# Patient Record
Sex: MALE | Race: WHITE | NOT HISPANIC OR LATINO | URBAN - METROPOLITAN AREA
[De-identification: names, ages, dates, MRNs, and addresses within clinical notes are randomized per-mention and may not be internally consistent; named-entity substitution may affect disease eponyms.]

---

## 2017-06-26 ENCOUNTER — OUTPATIENT (OUTPATIENT)
Dept: OUTPATIENT SERVICES | Facility: HOSPITAL | Age: 57
LOS: 1 days | Discharge: HOME | End: 2017-06-26

## 2017-06-28 ENCOUNTER — OUTPATIENT (OUTPATIENT)
Dept: OUTPATIENT SERVICES | Facility: HOSPITAL | Age: 57
LOS: 1 days | Discharge: HOME | End: 2017-06-28

## 2017-06-28 DIAGNOSIS — R31.9 HEMATURIA, UNSPECIFIED: ICD-10-CM

## 2017-06-28 DIAGNOSIS — E11.00 TYPE 2 DIABETES MELLITUS WITH HYPEROSMOLARITY WITHOUT NONKETOTIC HYPERGLYCEMIC-HYPEROSMOLAR COMA (NKHHC): ICD-10-CM

## 2017-06-28 DIAGNOSIS — R94.5 ABNORMAL RESULTS OF LIVER FUNCTION STUDIES: ICD-10-CM

## 2017-06-28 DIAGNOSIS — D50.9 IRON DEFICIENCY ANEMIA, UNSPECIFIED: ICD-10-CM

## 2017-06-28 DIAGNOSIS — R70.0 ELEVATED ERYTHROCYTE SEDIMENTATION RATE: ICD-10-CM

## 2017-06-28 DIAGNOSIS — D51.9 VITAMIN B12 DEFICIENCY ANEMIA, UNSPECIFIED: ICD-10-CM

## 2017-06-28 DIAGNOSIS — N40.0 BENIGN PROSTATIC HYPERPLASIA WITHOUT LOWER URINARY TRACT SYMPTOMS: ICD-10-CM

## 2017-06-28 DIAGNOSIS — E78.9 DISORDER OF LIPOPROTEIN METABOLISM, UNSPECIFIED: ICD-10-CM

## 2017-06-28 DIAGNOSIS — I10 ESSENTIAL (PRIMARY) HYPERTENSION: ICD-10-CM

## 2017-06-28 DIAGNOSIS — Z02.9 ENCOUNTER FOR ADMINISTRATIVE EXAMINATIONS, UNSPECIFIED: ICD-10-CM

## 2017-06-28 DIAGNOSIS — E50.9 VITAMIN A DEFICIENCY, UNSPECIFIED: ICD-10-CM

## 2017-06-28 DIAGNOSIS — E03.9 HYPOTHYROIDISM, UNSPECIFIED: ICD-10-CM

## 2017-06-28 DIAGNOSIS — D64.3 OTHER SIDEROBLASTIC ANEMIAS: ICD-10-CM

## 2017-06-28 DIAGNOSIS — E55.9 VITAMIN D DEFICIENCY, UNSPECIFIED: ICD-10-CM

## 2017-06-28 DIAGNOSIS — E07.9 DISORDER OF THYROID, UNSPECIFIED: ICD-10-CM

## 2017-06-28 DIAGNOSIS — R19.8 OTHER SPECIFIED SYMPTOMS AND SIGNS INVOLVING THE DIGESTIVE SYSTEM AND ABDOMEN: ICD-10-CM

## 2017-06-28 DIAGNOSIS — E78.5 HYPERLIPIDEMIA, UNSPECIFIED: ICD-10-CM

## 2018-01-26 ENCOUNTER — OUTPATIENT (OUTPATIENT)
Dept: OUTPATIENT SERVICES | Facility: HOSPITAL | Age: 58
LOS: 1 days | Discharge: HOME | End: 2018-01-26

## 2018-01-26 DIAGNOSIS — E11.00 TYPE 2 DIABETES MELLITUS WITH HYPEROSMOLARITY WITHOUT NONKETOTIC HYPERGLYCEMIC-HYPEROSMOLAR COMA (NKHHC): ICD-10-CM

## 2018-01-26 DIAGNOSIS — Z02.9 ENCOUNTER FOR ADMINISTRATIVE EXAMINATIONS, UNSPECIFIED: ICD-10-CM

## 2018-01-26 DIAGNOSIS — D64.3 OTHER SIDEROBLASTIC ANEMIAS: ICD-10-CM

## 2018-01-26 DIAGNOSIS — I10 ESSENTIAL (PRIMARY) HYPERTENSION: ICD-10-CM

## 2018-01-26 DIAGNOSIS — E78.5 HYPERLIPIDEMIA, UNSPECIFIED: ICD-10-CM

## 2018-01-26 DIAGNOSIS — E78.9 DISORDER OF LIPOPROTEIN METABOLISM, UNSPECIFIED: ICD-10-CM

## 2018-01-26 DIAGNOSIS — D51.9 VITAMIN B12 DEFICIENCY ANEMIA, UNSPECIFIED: ICD-10-CM

## 2018-01-26 DIAGNOSIS — R94.5 ABNORMAL RESULTS OF LIVER FUNCTION STUDIES: ICD-10-CM

## 2018-01-26 DIAGNOSIS — R31.9 HEMATURIA, UNSPECIFIED: ICD-10-CM

## 2018-01-26 DIAGNOSIS — E03.9 HYPOTHYROIDISM, UNSPECIFIED: ICD-10-CM

## 2018-01-26 DIAGNOSIS — R19.8 OTHER SPECIFIED SYMPTOMS AND SIGNS INVOLVING THE DIGESTIVE SYSTEM AND ABDOMEN: ICD-10-CM

## 2018-01-26 DIAGNOSIS — D50.9 IRON DEFICIENCY ANEMIA, UNSPECIFIED: ICD-10-CM

## 2018-01-26 DIAGNOSIS — E07.9 DISORDER OF THYROID, UNSPECIFIED: ICD-10-CM

## 2018-01-29 ENCOUNTER — OUTPATIENT (OUTPATIENT)
Dept: OUTPATIENT SERVICES | Facility: HOSPITAL | Age: 58
LOS: 1 days | Discharge: HOME | End: 2018-01-29

## 2018-01-29 DIAGNOSIS — D50.9 IRON DEFICIENCY ANEMIA, UNSPECIFIED: ICD-10-CM

## 2018-01-29 DIAGNOSIS — D61.9 APLASTIC ANEMIA, UNSPECIFIED: ICD-10-CM

## 2018-01-29 DIAGNOSIS — R31.9 HEMATURIA, UNSPECIFIED: ICD-10-CM

## 2018-01-29 DIAGNOSIS — D64.9 ANEMIA, UNSPECIFIED: ICD-10-CM

## 2018-02-15 ENCOUNTER — OUTPATIENT (OUTPATIENT)
Dept: OUTPATIENT SERVICES | Facility: HOSPITAL | Age: 58
LOS: 1 days | Discharge: HOME | End: 2018-02-15

## 2018-02-15 DIAGNOSIS — R70.0 ELEVATED ERYTHROCYTE SEDIMENTATION RATE: ICD-10-CM

## 2018-02-15 DIAGNOSIS — D50.9 IRON DEFICIENCY ANEMIA, UNSPECIFIED: ICD-10-CM

## 2018-02-15 DIAGNOSIS — D64.3 OTHER SIDEROBLASTIC ANEMIAS: ICD-10-CM

## 2018-02-15 DIAGNOSIS — I10 ESSENTIAL (PRIMARY) HYPERTENSION: ICD-10-CM

## 2018-02-15 DIAGNOSIS — E03.9 HYPOTHYROIDISM, UNSPECIFIED: ICD-10-CM

## 2018-02-15 DIAGNOSIS — N40.0 BENIGN PROSTATIC HYPERPLASIA WITHOUT LOWER URINARY TRACT SYMPTOMS: ICD-10-CM

## 2018-02-15 DIAGNOSIS — E78.5 HYPERLIPIDEMIA, UNSPECIFIED: ICD-10-CM

## 2018-02-15 DIAGNOSIS — E78.9 DISORDER OF LIPOPROTEIN METABOLISM, UNSPECIFIED: ICD-10-CM

## 2018-02-15 DIAGNOSIS — E07.9 DISORDER OF THYROID, UNSPECIFIED: ICD-10-CM

## 2018-02-15 DIAGNOSIS — R31.9 HEMATURIA, UNSPECIFIED: ICD-10-CM

## 2018-02-15 DIAGNOSIS — R19.8 OTHER SPECIFIED SYMPTOMS AND SIGNS INVOLVING THE DIGESTIVE SYSTEM AND ABDOMEN: ICD-10-CM

## 2018-02-15 DIAGNOSIS — E11.00 TYPE 2 DIABETES MELLITUS WITH HYPEROSMOLARITY WITHOUT NONKETOTIC HYPERGLYCEMIC-HYPEROSMOLAR COMA (NKHHC): ICD-10-CM

## 2018-02-15 DIAGNOSIS — D51.9 VITAMIN B12 DEFICIENCY ANEMIA, UNSPECIFIED: ICD-10-CM

## 2018-02-15 DIAGNOSIS — E55.9 VITAMIN D DEFICIENCY, UNSPECIFIED: ICD-10-CM

## 2018-12-24 ENCOUNTER — INPATIENT (INPATIENT)
Facility: HOSPITAL | Age: 58
LOS: 2 days | Discharge: HOME HEALTH | End: 2018-12-27
Attending: HOSPITALIST | Admitting: HOSPITALIST

## 2018-12-24 VITALS
SYSTOLIC BLOOD PRESSURE: 116 MMHG | TEMPERATURE: 101 F | RESPIRATION RATE: 20 BRPM | DIASTOLIC BLOOD PRESSURE: 66 MMHG | OXYGEN SATURATION: 94 % | WEIGHT: 145.06 LBS | HEART RATE: 109 BPM

## 2018-12-24 LAB
ALBUMIN SERPL ELPH-MCNC: 3.6 G/DL — SIGNIFICANT CHANGE UP (ref 3.5–5.2)
ALP SERPL-CCNC: 60 U/L — SIGNIFICANT CHANGE UP (ref 30–115)
ALT FLD-CCNC: 22 U/L — SIGNIFICANT CHANGE UP (ref 0–41)
ANION GAP SERPL CALC-SCNC: 13 MMOL/L — SIGNIFICANT CHANGE UP (ref 7–14)
APPEARANCE UR: ABNORMAL
APTT BLD: 39.2 SEC — SIGNIFICANT CHANGE UP (ref 27–39.2)
AST SERPL-CCNC: 30 U/L — SIGNIFICANT CHANGE UP (ref 0–41)
BASOPHILS # BLD AUTO: 0.01 K/UL — SIGNIFICANT CHANGE UP (ref 0–0.2)
BASOPHILS NFR BLD AUTO: 0.1 % — SIGNIFICANT CHANGE UP (ref 0–1)
BILIRUB SERPL-MCNC: 0.3 MG/DL — SIGNIFICANT CHANGE UP (ref 0.2–1.2)
BILIRUB UR-MCNC: NEGATIVE — SIGNIFICANT CHANGE UP
BUN SERPL-MCNC: 10 MG/DL — SIGNIFICANT CHANGE UP (ref 10–20)
CALCIUM SERPL-MCNC: 8.4 MG/DL — LOW (ref 8.5–10.1)
CHLORIDE SERPL-SCNC: 95 MMOL/L — LOW (ref 98–110)
CO2 SERPL-SCNC: 29 MMOL/L — SIGNIFICANT CHANGE UP (ref 17–32)
COLOR SPEC: YELLOW — SIGNIFICANT CHANGE UP
CREAT SERPL-MCNC: <0.5 MG/DL — LOW (ref 0.7–1.5)
DIFF PNL FLD: ABNORMAL
EOSINOPHIL # BLD AUTO: 0 K/UL — SIGNIFICANT CHANGE UP (ref 0–0.7)
EOSINOPHIL NFR BLD AUTO: 0 % — SIGNIFICANT CHANGE UP (ref 0–8)
FLU A RESULT: POSITIVE
FLU A RESULT: POSITIVE
FLUAV AG NPH QL: POSITIVE
FLUBV AG NPH QL: NEGATIVE — SIGNIFICANT CHANGE UP
GAS PNL BLDV: SIGNIFICANT CHANGE UP
GLUCOSE SERPL-MCNC: 94 MG/DL — SIGNIFICANT CHANGE UP (ref 70–99)
GLUCOSE UR QL: NEGATIVE MG/DL — SIGNIFICANT CHANGE UP
HCT VFR BLD CALC: 42.1 % — SIGNIFICANT CHANGE UP (ref 42–52)
HGB BLD-MCNC: 13.8 G/DL — LOW (ref 14–18)
IMM GRANULOCYTES NFR BLD AUTO: 0.3 % — SIGNIFICANT CHANGE UP (ref 0.1–0.3)
INR BLD: 1.07 RATIO — SIGNIFICANT CHANGE UP (ref 0.65–1.3)
KETONES UR-MCNC: >=80
LEUKOCYTE ESTERASE UR-ACNC: NEGATIVE — SIGNIFICANT CHANGE UP
LYMPHOCYTES # BLD AUTO: 0.37 K/UL — LOW (ref 1.2–3.4)
LYMPHOCYTES # BLD AUTO: 3.2 % — LOW (ref 20.5–51.1)
MCHC RBC-ENTMCNC: 28.8 PG — SIGNIFICANT CHANGE UP (ref 27–31)
MCHC RBC-ENTMCNC: 32.8 G/DL — SIGNIFICANT CHANGE UP (ref 32–37)
MCV RBC AUTO: 87.7 FL — SIGNIFICANT CHANGE UP (ref 80–94)
MONOCYTES # BLD AUTO: 0.83 K/UL — HIGH (ref 0.1–0.6)
MONOCYTES NFR BLD AUTO: 7.1 % — SIGNIFICANT CHANGE UP (ref 1.7–9.3)
NEUTROPHILS # BLD AUTO: 10.39 K/UL — HIGH (ref 1.4–6.5)
NEUTROPHILS NFR BLD AUTO: 89.3 % — HIGH (ref 42.2–75.2)
NITRITE UR-MCNC: NEGATIVE — SIGNIFICANT CHANGE UP
PH UR: 5.5 — SIGNIFICANT CHANGE UP (ref 5–8)
PLATELET # BLD AUTO: 103 K/UL — LOW (ref 130–400)
POTASSIUM SERPL-MCNC: 3.6 MMOL/L — SIGNIFICANT CHANGE UP (ref 3.5–5)
POTASSIUM SERPL-SCNC: 3.6 MMOL/L — SIGNIFICANT CHANGE UP (ref 3.5–5)
PROT SERPL-MCNC: 6.3 G/DL — SIGNIFICANT CHANGE UP (ref 6–8)
PROT UR-MCNC: NEGATIVE MG/DL — SIGNIFICANT CHANGE UP
PROTHROM AB SERPL-ACNC: 12.3 SEC — SIGNIFICANT CHANGE UP (ref 9.95–12.87)
RBC # BLD: 4.8 M/UL — SIGNIFICANT CHANGE UP (ref 4.7–6.1)
RBC # FLD: 13.8 % — SIGNIFICANT CHANGE UP (ref 11.5–14.5)
RSV RESULT: NEGATIVE — SIGNIFICANT CHANGE UP
RSV RNA RESP QL NAA+PROBE: NEGATIVE — SIGNIFICANT CHANGE UP
SODIUM SERPL-SCNC: 137 MMOL/L — SIGNIFICANT CHANGE UP (ref 135–146)
SP GR SPEC: 1.01 — SIGNIFICANT CHANGE UP (ref 1.01–1.03)
TROPONIN T SERPL-MCNC: <0.01 NG/ML — SIGNIFICANT CHANGE UP
UROBILINOGEN FLD QL: 0.2 MG/DL — SIGNIFICANT CHANGE UP (ref 0.2–0.2)
WBC # BLD: 11.64 K/UL — HIGH (ref 4.8–10.8)
WBC # FLD AUTO: 11.64 K/UL — HIGH (ref 4.8–10.8)

## 2018-12-24 RX ORDER — AZITHROMYCIN 500 MG/1
500 TABLET, FILM COATED ORAL ONCE
Qty: 0 | Refills: 0 | Status: COMPLETED | OUTPATIENT
Start: 2018-12-24 | End: 2018-12-24

## 2018-12-24 RX ORDER — CEFTRIAXONE 500 MG/1
1 INJECTION, POWDER, FOR SOLUTION INTRAMUSCULAR; INTRAVENOUS ONCE
Qty: 0 | Refills: 0 | Status: COMPLETED | OUTPATIENT
Start: 2018-12-24 | End: 2018-12-24

## 2018-12-24 RX ORDER — ENOXAPARIN SODIUM 100 MG/ML
40 INJECTION SUBCUTANEOUS EVERY 24 HOURS
Qty: 0 | Refills: 0 | Status: DISCONTINUED | OUTPATIENT
Start: 2018-12-24 | End: 2018-12-27

## 2018-12-24 RX ORDER — SODIUM CHLORIDE 9 MG/ML
2000 INJECTION, SOLUTION INTRAVENOUS ONCE
Qty: 0 | Refills: 0 | Status: COMPLETED | OUTPATIENT
Start: 2018-12-24 | End: 2018-12-24

## 2018-12-24 RX ORDER — ALBUTEROL 90 UG/1
10 AEROSOL, METERED ORAL
Qty: 0 | Refills: 0 | COMMUNITY

## 2018-12-24 RX ORDER — ALBUTEROL 90 UG/1
10 AEROSOL, METERED ORAL
Qty: 0 | Refills: 0 | Status: DISCONTINUED | OUTPATIENT
Start: 2018-12-24 | End: 2018-12-27

## 2018-12-24 RX ORDER — ACETAMINOPHEN 500 MG
650 TABLET ORAL EVERY 6 HOURS
Qty: 0 | Refills: 0 | Status: DISCONTINUED | OUTPATIENT
Start: 2018-12-24 | End: 2018-12-27

## 2018-12-24 RX ADMIN — Medication 75 MILLIGRAM(S): at 19:59

## 2018-12-24 RX ADMIN — SODIUM CHLORIDE 2000 MILLILITER(S): 9 INJECTION, SOLUTION INTRAVENOUS at 15:05

## 2018-12-24 RX ADMIN — AZITHROMYCIN 500 MILLIGRAM(S): 500 TABLET, FILM COATED ORAL at 16:46

## 2018-12-24 RX ADMIN — CEFTRIAXONE 100 GRAM(S): 500 INJECTION, POWDER, FOR SOLUTION INTRAMUSCULAR; INTRAVENOUS at 15:18

## 2018-12-24 RX ADMIN — CEFTRIAXONE 1 GRAM(S): 500 INJECTION, POWDER, FOR SOLUTION INTRAMUSCULAR; INTRAVENOUS at 15:33

## 2018-12-24 RX ADMIN — AZITHROMYCIN 255 MILLIGRAM(S): 500 TABLET, FILM COATED ORAL at 15:33

## 2018-12-24 NOTE — ED PROVIDER NOTE - PHYSICAL EXAMINATION
Con: Chronically ill appearing, cachectic, tachypneic   HEENT: NCAT PERRLA EOMI conjunctiva normal. No nasal discharge. dry MM  Neck: Supple, non tender, full ROM.   CV: regular tachycardic no MRG +S1S2.   Pulm: CTA b/l. tachypneic, retracting, poor respiratory effort  Abd: s NT ND +BS.   Ext: WWP x4, moving all extremities, no edema.   Psy: Cooperative, appropriate.   Skin: Warm, dry, no rash

## 2018-12-24 NOTE — ED PROVIDER NOTE - OBJECTIVE STATEMENT
57yo M hx LGM muscular dystrophy followed at [facility] pw cough, fever, shortness of breath since Fri getting progressively worse- typically uses CPAP at night but now requires further support during the day- no nausea/vomiting/diarrhea, abdominal pain. +Chest pressure this AM. No dysuria/hematuria, back pain, recent hospitalization.

## 2018-12-24 NOTE — ED PROVIDER NOTE - CARE PLAN
Principal Discharge DX:	Influenza A  Secondary Diagnosis:	Respiratory failure  Secondary Diagnosis:	BiPAP (biphasic positive airway pressure) dependence

## 2018-12-24 NOTE — H&P ADULT - ATTENDING COMMENTS
Patient seen and examined independently. Agree with resident note with exceptions.     #Acute on Chronic Hypoxic respiratory failure sec to Influenza A with peumonia  -F/u  Blood cultures  - BIPAP prn  - ABG  - monitor oxygen sat, maintain>92  - C/w Tamiflu, levaquin  - pt evaluated by pulmonary    # Hypokalemia  - replete k    # Limb-Girdle Muscular Dystrophy with quadriplegia -stable    #DVT prophylaxis

## 2018-12-24 NOTE — ED PROVIDER NOTE - CRITICAL CARE PROVIDED
consult w/ pt's family directly relating to pts condition/conducted a detailed discussion of DNR status/additional history taking/interpretation of diagnostic studies/direct patient care (not related to procedure)/consultation with other physicians/documentation

## 2018-12-24 NOTE — ED PROVIDER NOTE - PROGRESS NOTE DETAILS
ATTENDING NOTE:   57 y/o M with PMH of muscular dystrophy following with neurologist at [facility], To ED for evaluation of cough and SOB. +Fever x3 days with Tmax 101. Wife reports SX worse today so came to ED for evaluation. (+)Sick contact at home with similar.   On exam: Pt is emaciated, cachectic.  Dry MM. Lungs with (+) rhonchi b/l. Tachycardic. Abdomen soft, NT/ND.   Plan for XR, labs, UA, neurology consult, NIFF/VC by respiratory, reassess. Sepsis suspected at this time. dw ICU fellow debal- at bedside. pt feeling improved on bipap. NIF unable to be obtained 2/2 extremely poor respiratory effort. dw pt and wife, pt is DNI, not DNR sp ICU eval- no need for ICU at this time

## 2018-12-24 NOTE — H&P ADULT - HISTORY OF PRESENT ILLNESS
This is a 57 YO M, [facility] 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, on NIV at home (CPAP/BiPAP/AVAPS?) presents with fevers, lethargy, decreased appetite, and respiratory distress since Friday. Denies any other complaints including chest pain, nausea/vomiting/diarrhea, headache, and cough (patient has poor inspiratory effort and is unable to cough). In the ED he was found to be positive for Influenza A, and was given a dose of oseltamivir and placed on BiPAP. Currently doing well, stable, although difficulty speaking and shallow breathing as is his baseline.   Of note, the patient is otherwise healthy and still active and practicing as an .     Vital Signs Last 24 Hrs  T(C): 36.7 (24 Dec 2018 15:49), Max: 38.5 (24 Dec 2018 13:17)  T(F): 98 (24 Dec 2018 15:49), Max: 101.3 (24 Dec 2018 13:17)  HR: 94 (24 Dec 2018 15:49) (94 - 109)  BP: 143/78 (24 Dec 2018 15:49) (116/66 - 143/78)  BP(mean): --  RR: 17 (24 Dec 2018 15:49) (17 - 20)  SpO2: 100% (24 Dec 2018 15:49) (94% - 100%) This is a 57 YO M, [facility] 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, on NIV at home (CPAP/BiPAP/AVAPS?) presents with fevers, lethargy, decreased appetite, and respiratory distress since Friday. Denies any other complaints including chest pain, nausea/vomiting/diarrhea, headache, and cough (patient has poor inspiratory effort and is unable to cough). In the ED he was found to be positive for Influenza A, and was given a dose of oseltamivir and placed on BiPAP. Currently doing well, stable, although difficulty speaking and shallow breathing as is his baseline.   Of note, the patient is otherwise healthy and still active and practicing as an . He states he did have to flu shot this year.    Vital Signs Last 24 Hrs  T(C): 36.7 (24 Dec 2018 15:49), Max: 38.5 (24 Dec 2018 13:17)  T(F): 98 (24 Dec 2018 15:49), Max: 101.3 (24 Dec 2018 13:17)  HR: 94 (24 Dec 2018 15:49) (94 - 109)  BP: 143/78 (24 Dec 2018 15:49) (116/66 - 143/78)  BP(mean): --  RR: 17 (24 Dec 2018 15:49) (17 - 20)  SpO2: 100% (24 Dec 2018 15:49) (94% - 100%)

## 2018-12-24 NOTE — ED ADULT NURSE NOTE - OBJECTIVE STATEMENT
Patient present to ED with complains of SOB, fever, chills, and lethargic for 2 days. Patient uses respirator at home and have found increasingly needing more sessions, as per wife, is eating less and more tired. Did not see health care provider.

## 2018-12-24 NOTE — H&P ADULT - NSHPLABSRESULTS_GEN_ALL_CORE
13.8   11.64 )-----------( 103      ( 24 Dec 2018 14:30 )             42.1           137  |  95<L>  |  10  ----------------------------<  94  3.6   |  29  |  <0.5<L>    Ca    8.4<L>      24 Dec 2018 14:30    TPro  6.3  /  Alb  3.6  /  TBili  0.3  /  DBili  x   /  AST  30  /  ALT  22  /  AlkPhos  60  12            Urinalysis Basic - ( 24 Dec 2018 17:00 )    Color: Yellow / Appearance: Cloudy / S.015 / pH: x  Gluc: x / Ketone: >=80  / Bili: Negative / Urobili: 0.2 mg/dL   Blood: x / Protein: Negative mg/dL / Nitrite: Negative   Leuk Esterase: Negative / RBC: 6-10 /HPF / WBC 3-5 /HPF   Sq Epi: x / Non Sq Epi: x / Bacteria: Few /HPF        PT/INR - ( 24 Dec 2018 14:30 )   PT: 12.30 sec;   INR: 1.07 ratio         PTT - ( 24 Dec 2018 14:30 )  PTT:39.2 sec      CARDIAC MARKERS ( 24 Dec 2018 14:30 )  x     / <0.01 ng/mL / x     / x     / x          14:53 - VBG - pH: 7.36  | pCO2: 59    | pO2: 22    | Lactate: 1.3    CXR: No radiographic evidence of CP disease

## 2018-12-24 NOTE — ED PROVIDER NOTE - NS ED ROS FT
Constitutional:  See HPI.   Eyes:  No visual changes, eye pain or discharge.  ENMT:  No hearing changes, pain, discharge or infections. No neck pain or stiffness.  GI:  No nausea, vomiting, diarrhea, abdominal pain.  :  No dysuria, frequency, hematuria  MS:  No joint pain or back pain.  Neuro:  No LOC. No headache or weakness.    Skin:  No skin rash.  Except as in HPI, all other review of systems is negative

## 2018-12-24 NOTE — ED ADULT TRIAGE NOTE - CHIEF COMPLAINT QUOTE
SOB x 2 day with increase use of CPAP during the day. pt usually use it over night only. pt denies chest pain

## 2018-12-24 NOTE — H&P ADULT - ASSESSMENT
59 YO M with muscular dystrophy presents with fevers and respiratory distress and found to have influenza A.     1) Respiratory distress 2/2 Influenza A  - c/w BiPAP as needed  - c/w Tamiflu  - Monitor O2 saturation, VS, give tylenol for fevers PRN  - Will start Levaquin for possible superimposed PNA as Pt has poor respiratory effort and is high risk for infection      2) PPx/Diet/Dispo  - Lovenox  - Regular diet  - Patient wishes to be DNI but not DNR, I explained that this is not possible since if patient is in a code he will have to be intubated. He understood and states he does not wish to be "on machines" long term. He will remain full code for now and will defer the decision to his wife if the time comes.

## 2018-12-24 NOTE — H&P ADULT - NSHPREVIEWOFSYSTEMS_GEN_ALL_CORE
REVIEW OF SYSTEMS:    CONSTITUTIONAL:  See HPI  EYES/ENT: No visual changes;  No vertigo or throat pain   NECK: No pain or stiffness  RESPIRATORY: See HPI  CARDIOVASCULAR: No chest pain or palpitations  GASTROINTESTINAL: No abdominal or epigastric pain. No nausea, vomiting, or hematemesis; No diarrhea or constipation. No melena or hematochezia.  GENITOURINARY: No dysuria, frequency or hematuria  NEUROLOGICAL: No numbness or weakness  MUSCULOSKELETAL: No muscle or joint pain, stiffness, or swelling  SKIN: No itching, rashes

## 2018-12-24 NOTE — H&P ADULT - NSHPPHYSICALEXAM_GEN_ALL_CORE
PHYSICAL EXAM:  GENERAL: Thin, cachectic male, lying in bed, in NAD  HEAD:  Atraumatic, Normocephalic  EYES: EOMI, PERRLA, conjunctiva and sclera clear  NECK: Supple, No JVD  CHEST/LUNG: Clear to auscultation bilaterally; on BiPAP  HEART: Regular rate and rhythm; No murmurs, rubs, or gallops  ABDOMEN: Soft, Nontender, Nondistended; Bowel sounds present  EXTREMITIES:  2+ Peripheral Pulses, No clubbing, cyanosis, or edema  PSYCH: AAOx3  NEUROLOGY: Quadriplegic  SKIN: No rashes or lesions

## 2018-12-25 LAB
ANION GAP SERPL CALC-SCNC: 19 MMOL/L — HIGH (ref 7–14)
BASOPHILS # BLD AUTO: 0.01 K/UL — SIGNIFICANT CHANGE UP (ref 0–0.2)
BASOPHILS NFR BLD AUTO: 0.1 % — SIGNIFICANT CHANGE UP (ref 0–1)
BUN SERPL-MCNC: 6 MG/DL — LOW (ref 10–20)
CALCIUM SERPL-MCNC: 7.9 MG/DL — LOW (ref 8.5–10.1)
CHLORIDE SERPL-SCNC: 97 MMOL/L — LOW (ref 98–110)
CO2 SERPL-SCNC: 25 MMOL/L — SIGNIFICANT CHANGE UP (ref 17–32)
CREAT SERPL-MCNC: <0.5 MG/DL — LOW (ref 0.7–1.5)
CULTURE RESULTS: NO GROWTH — SIGNIFICANT CHANGE UP
EOSINOPHIL # BLD AUTO: 0 K/UL — SIGNIFICANT CHANGE UP (ref 0–0.7)
EOSINOPHIL NFR BLD AUTO: 0 % — SIGNIFICANT CHANGE UP (ref 0–8)
GLUCOSE BLDC GLUCOMTR-MCNC: 108 MG/DL — HIGH (ref 70–99)
GLUCOSE SERPL-MCNC: 59 MG/DL — LOW (ref 70–99)
HCT VFR BLD CALC: 36.7 % — LOW (ref 42–52)
HGB BLD-MCNC: 11.9 G/DL — LOW (ref 14–18)
IMM GRANULOCYTES NFR BLD AUTO: 0.3 % — SIGNIFICANT CHANGE UP (ref 0.1–0.3)
LYMPHOCYTES # BLD AUTO: 0.76 K/UL — LOW (ref 1.2–3.4)
LYMPHOCYTES # BLD AUTO: 8.6 % — LOW (ref 20.5–51.1)
MAGNESIUM SERPL-MCNC: 1.8 MG/DL — SIGNIFICANT CHANGE UP (ref 1.8–2.4)
MCHC RBC-ENTMCNC: 28.7 PG — SIGNIFICANT CHANGE UP (ref 27–31)
MCHC RBC-ENTMCNC: 32.4 G/DL — SIGNIFICANT CHANGE UP (ref 32–37)
MCV RBC AUTO: 88.4 FL — SIGNIFICANT CHANGE UP (ref 80–94)
MONOCYTES # BLD AUTO: 0.73 K/UL — HIGH (ref 0.1–0.6)
MONOCYTES NFR BLD AUTO: 8.3 % — SIGNIFICANT CHANGE UP (ref 1.7–9.3)
NEUTROPHILS # BLD AUTO: 7.28 K/UL — HIGH (ref 1.4–6.5)
NEUTROPHILS NFR BLD AUTO: 82.7 % — HIGH (ref 42.2–75.2)
PLATELET # BLD AUTO: 83 K/UL — LOW (ref 130–400)
POTASSIUM SERPL-MCNC: 3.1 MMOL/L — LOW (ref 3.5–5)
POTASSIUM SERPL-SCNC: 3.1 MMOL/L — LOW (ref 3.5–5)
RBC # BLD: 4.15 M/UL — LOW (ref 4.7–6.1)
RBC # FLD: 13.7 % — SIGNIFICANT CHANGE UP (ref 11.5–14.5)
SODIUM SERPL-SCNC: 141 MMOL/L — SIGNIFICANT CHANGE UP (ref 135–146)
SPECIMEN SOURCE: SIGNIFICANT CHANGE UP
WBC # BLD: 8.81 K/UL — SIGNIFICANT CHANGE UP (ref 4.8–10.8)
WBC # FLD AUTO: 8.81 K/UL — SIGNIFICANT CHANGE UP (ref 4.8–10.8)

## 2018-12-25 RX ORDER — INFLUENZA VIRUS VACCINE 15; 15; 15; 15 UG/.5ML; UG/.5ML; UG/.5ML; UG/.5ML
0.5 SUSPENSION INTRAMUSCULAR ONCE
Qty: 0 | Refills: 0 | Status: COMPLETED | OUTPATIENT
Start: 2018-12-25 | End: 2018-12-25

## 2018-12-25 RX ORDER — POTASSIUM CHLORIDE 20 MEQ
40 PACKET (EA) ORAL ONCE
Qty: 0 | Refills: 0 | Status: COMPLETED | OUTPATIENT
Start: 2018-12-25 | End: 2018-12-25

## 2018-12-25 RX ADMIN — Medication 40 MILLIEQUIVALENT(S): at 11:53

## 2018-12-25 RX ADMIN — Medication 75 MILLIGRAM(S): at 17:31

## 2018-12-25 RX ADMIN — Medication 75 MILLIGRAM(S): at 06:19

## 2018-12-25 RX ADMIN — ENOXAPARIN SODIUM 40 MILLIGRAM(S): 100 INJECTION SUBCUTANEOUS at 06:19

## 2018-12-25 RX ADMIN — ALBUTEROL 10 MILLIGRAM(S): 90 AEROSOL, METERED ORAL at 09:45

## 2018-12-25 NOTE — CONSULT NOTE ADULT - ASSESSMENT
IMPRESSION:  Influenza +  LLL PNA  Muscular Dystrophy    Recommendations:  Continue oselatamivir, levaquin  Check ABG for evidence of hypercapnia  Supplemental O2 to maintain SpO2 > 92%  Neurology evaluation IMPRESSION:  Influenza +  LLL PNA  Muscular Dystrophy    Recommendations:  Continue oselatamivir, levaquin  Check ABG for evidence of hypercapnia  Supplemental O2 to maintain SpO2 > 92%  Neurology evaluation  now feel much betetr not on distress ate in morning talking full sentence on NC   no need for ICU monitoring

## 2018-12-25 NOTE — CONSULT NOTE ADULT - SUBJECTIVE AND OBJECTIVE BOX
Patient is a 58y old  Male who presents with a chief complaint of Fever, Respiratory distress (24 Dec 2018 17:40)      HPI:  This is a 59 YO M, PMH 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, on NIV at home (CPAP/BiPAP/AVAPS?) presents with fevers, lethargy, decreased appetite, and respiratory distress since Friday. Denies any other complaints including chest pain, nausea/vomiting/diarrhea, headache, and cough (patient has poor inspiratory effort and is unable to cough). In the ED he was found to be positive for Influenza A, and was given a dose of oseltamivir and placed on BiPAP. Currently doing well, stable, although difficulty speaking and shallow breathing as is his baseline.   Of note, the patient is otherwise healthy and still active and practicing as an . He states he did have to flu shot this year.    Vital Signs Last 24 Hrs  T(C): 36.7 (24 Dec 2018 15:49), Max: 38.5 (24 Dec 2018 13:17)  T(F): 98 (24 Dec 2018 15:49), Max: 101.3 (24 Dec 2018 13:17)  HR: 94 (24 Dec 2018 15:49) (94 - 109)  BP: 143/78 (24 Dec 2018 15:49) (116/66 - 143/78)  RR: 17 (24 Dec 2018 15:49) (17 - 20)  SpO2: 100% (24 Dec 2018 15:49) (94% - 100%) (24 Dec 2018 17:40)      PAST MEDICAL & SURGICAL HISTORY:  Muscular dystrophy  No significant past surgical history      SOCIAL HX:   Smoking   denies                      ETOH                            Other    FAMILY HISTORY:  No pertinent family history in first degree relatives  :  No known cardiovacular family hisotry     ROS:  See HPI     Allergies    No Known Allergies    Intolerances          PHYSICAL EXAM    ICU Vital Signs Last 24 Hrs  T(C): 36.1 (25 Dec 2018 05:27), Max: 38.5 (24 Dec 2018 13:17)  T(F): 97 (25 Dec 2018 05:27), Max: 101.3 (24 Dec 2018 13:17)  HR: 80 (25 Dec 2018 05:27) (80 - 109)  BP: 116/66 (25 Dec 2018 05:27) (106/60 - 143/78)  RR: 20 (25 Dec 2018 05:27) (17 - 20)  SpO2: 97% (24 Dec 2018 20:00) (94% - 100%)      General: In NAD   HEENT:  CARMELINA              Lymphatic system: No cervical LN   Lungs: Bilateral BS, using respiratory accessory muscles, but it is his baseline  Cardiovascular: Regular  Gastrointestinal: Soft, Positive BS  Musculoskeletal: No clubbing.  Moves all extremities.  Full range of motion   Skin: Warm.  Intact  Neurological: No motor or sensory deficit       18 @ 07:01  -  18 @ 07:00  --------------------------------------------------------  IN:  Total IN: 0 mL    OUT:    Indwelling Catheter - Urethral: 550 mL  Total OUT: 550 mL    Total NET: -550 mL          LABS:                          11.9   8.81  )-----------( 83       ( 25 Dec 2018 05:31 )             36.7                                               12-25    141  |  97<L>  |  6<L>  ----------------------------<  59<L>  3.1<L>   |  25  |  <0.5<L>    Ca    7.9<L>      25 Dec 2018 05:31  Mg     1.8     12-    TPro  6.3  /  Alb  3.6  /  TBili  0.3  /  DBili  x   /  AST  30  /  ALT  22  /  AlkPhos  60  12-24      PT/INR - ( 24 Dec 2018 14:30 )   PT: 12.30 sec;   INR: 1.07 ratio         PTT - ( 24 Dec 2018 14:30 )  PTT:39.2 sec                                       Urinalysis Basic - ( 24 Dec 2018 17:00 )    Color: Yellow / Appearance: Cloudy / S.015 / pH: x  Gluc: x / Ketone: >=80  / Bili: Negative / Urobili: 0.2 mg/dL   Blood: x / Protein: Negative mg/dL / Nitrite: Negative   Leuk Esterase: Negative / RBC: 6-10 /HPF / WBC 3-5 /HPF   Sq Epi: x / Non Sq Epi: x / Bacteria: Few /HPF        CARDIAC MARKERS ( 24 Dec 2018 14:30 )  x     / <0.01 ng/mL / x     / x     / x                                                LIVER FUNCTIONS - ( 24 Dec 2018 14:30 )  Alb: 3.6 g/dL / Pro: 6.3 g/dL / ALK PHOS: 60 U/L / ALT: 22 U/L / AST: 30 U/L / GGT: x                                                                                                                                   ABG - ( 24 Dec 2018 18:30 )  pH, Arterial: 7.54  pH, Blood: x     /  pCO2: 32    /  pO2: 179   / HCO3: 27    / Base Excess: 4.8   /  SaO2: 100                 X-Rays   CXR: LLL infiltrate                                                                                  ECHO    MEDICATIONS  (STANDING):  ALBUTerol  Tablet 10 milliGRAM(s) Oral <User Schedule>  enoxaparin Injectable 40 milliGRAM(s) SubCutaneous every 24 hours  influenza   Vaccine 0.5 milliLiter(s) IntraMuscular once  levoFLOXacin IVPB 750 milliGRAM(s) IV Intermittent every 24 hours  oseltamivir 75 milliGRAM(s) Oral two times a day  potassium chloride    Tablet ER 40 milliEquivalent(s) Oral once    MEDICATIONS  (PRN):  acetaminophen   Tablet .. 650 milliGRAM(s) Oral every 6 hours PRN Temp greater or equal to 38C (100.4F) Patient is a 58y old  Male who presents with a chief complaint of Fever, Respiratory distress (24 Dec 2018 17:40)      HPI:  This is a 57 YO M, PMH 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, on NIV at home (CPAP/BiPAP/AVAPS?) presents with fevers, lethargy, decreased appetite, and respiratory distress since Friday. Denies any other complaints including chest pain, nausea/vomiting/diarrhea, headache, and cough (patient has poor inspiratory effort and is unable to cough). In the ED he was found to be positive for Influenza A, and was given a dose of oseltamivir and placed on BiPAP. Currently doing well, stable, although difficulty speaking and shallow breathing as is his baseline.   Of note, the patient is otherwise healthy and still active and practicing as an . He states he did have to flu shot this year.    Vital Signs Last 24 Hrs  T(C): 36.7 (24 Dec 2018 15:49), Max: 38.5 (24 Dec 2018 13:17)  T(F): 98 (24 Dec 2018 15:49), Max: 101.3 (24 Dec 2018 13:17)  HR: 94 (24 Dec 2018 15:49) (94 - 109)  BP: 143/78 (24 Dec 2018 15:49) (116/66 - 143/78)  RR: 17 (24 Dec 2018 15:49) (17 - 20)  SpO2: 100% (24 Dec 2018 15:49) (94% - 100%) (24 Dec 2018 17:40)      PAST MEDICAL & SURGICAL HISTORY:  Muscular dystrophy  No significant past surgical history      SOCIAL HX:   Smoking   denies                      ETOH                            Other    FAMILY HISTORY:  No pertinent family history in first degree relatives  :  No known cardiovacular family hisotry     ROS:  See HPI     Allergies    No Known Allergies    Intolerances          PHYSICAL EXAM    ICU Vital Signs Last 24 Hrs  T(C): 36.1 (25 Dec 2018 05:27), Max: 38.5 (24 Dec 2018 13:17)  T(F): 97 (25 Dec 2018 05:27), Max: 101.3 (24 Dec 2018 13:17)  HR: 80 (25 Dec 2018 05:27) (80 - 109)  BP: 116/66 (25 Dec 2018 05:27) (106/60 - 143/78)  RR: 20 (25 Dec 2018 05:27) (17 - 20)  SpO2: 97% (24 Dec 2018 20:00) (94% - 100%)      General: In NAD awake oriented follow not on distress   HEENT:  CARMELINA              Lymphatic system: No cervical LN   Lungs: Bilateral BS, using respiratory accessory muscles, but it is his baseline  Cardiovascular: Regular  Gastrointestinal: Soft, Positive BS  Musculoskeletal: No clubbing.  Moves all extremities.  Full range of motion   Skin: Warm.  Intact  Neurological: No motor or sensory deficit       18 @ 07:01  -  18 @ 07:00  --------------------------------------------------------  IN:  Total IN: 0 mL    OUT:    Indwelling Catheter - Urethral: 550 mL  Total OUT: 550 mL    Total NET: -550 mL          LABS:                          11.9   8.81  )-----------( 83       ( 25 Dec 2018 05:31 )             36.7                                               12-    141  |  97<L>  |  6<L>  ----------------------------<  59<L>  3.1<L>   |  25  |  <0.5<L>    Ca    7.9<L>      25 Dec 2018 05:31  Mg     1.8         TPro  6.3  /  Alb  3.6  /  TBili  0.3  /  DBili  x   /  AST  30  /  ALT  22  /  AlkPhos  60  12-24      PT/INR - ( 24 Dec 2018 14:30 )   PT: 12.30 sec;   INR: 1.07 ratio         PTT - ( 24 Dec 2018 14:30 )  PTT:39.2 sec                                       Urinalysis Basic - ( 24 Dec 2018 17:00 )    Color: Yellow / Appearance: Cloudy / S.015 / pH: x  Gluc: x / Ketone: >=80  / Bili: Negative / Urobili: 0.2 mg/dL   Blood: x / Protein: Negative mg/dL / Nitrite: Negative   Leuk Esterase: Negative / RBC: 6-10 /HPF / WBC 3-5 /HPF   Sq Epi: x / Non Sq Epi: x / Bacteria: Few /HPF        CARDIAC MARKERS ( 24 Dec 2018 14:30 )  x     / <0.01 ng/mL / x     / x     / x                                                LIVER FUNCTIONS - ( 24 Dec 2018 14:30 )  Alb: 3.6 g/dL / Pro: 6.3 g/dL / ALK PHOS: 60 U/L / ALT: 22 U/L / AST: 30 U/L / GGT: x                                                                                                                                   ABG - ( 24 Dec 2018 18:30 )  pH, Arterial: 7.54  pH, Blood: x     /  pCO2: 32    /  pO2: 179   / HCO3: 27    / Base Excess: 4.8   /  SaO2: 100                 X-Rays   CXR: LLL infiltrate                                                                                  ECHO    MEDICATIONS  (STANDING):  ALBUTerol  Tablet 10 milliGRAM(s) Oral <User Schedule>  enoxaparin Injectable 40 milliGRAM(s) SubCutaneous every 24 hours  influenza   Vaccine 0.5 milliLiter(s) IntraMuscular once  levoFLOXacin IVPB 750 milliGRAM(s) IV Intermittent every 24 hours  oseltamivir 75 milliGRAM(s) Oral two times a day  potassium chloride    Tablet ER 40 milliEquivalent(s) Oral once    MEDICATIONS  (PRN):  acetaminophen   Tablet .. 650 milliGRAM(s) Oral every 6 hours PRN Temp greater or equal to 38C (100.4F)

## 2018-12-26 LAB
ANION GAP SERPL CALC-SCNC: 11 MMOL/L — SIGNIFICANT CHANGE UP (ref 7–14)
BUN SERPL-MCNC: 9 MG/DL — LOW (ref 10–20)
CALCIUM SERPL-MCNC: 8.1 MG/DL — LOW (ref 8.5–10.1)
CHLORIDE SERPL-SCNC: 98 MMOL/L — SIGNIFICANT CHANGE UP (ref 98–110)
CO2 SERPL-SCNC: 31 MMOL/L — SIGNIFICANT CHANGE UP (ref 17–32)
CREAT SERPL-MCNC: <0.5 MG/DL — LOW (ref 0.7–1.5)
GLUCOSE SERPL-MCNC: 76 MG/DL — SIGNIFICANT CHANGE UP (ref 70–99)
POTASSIUM SERPL-MCNC: 3.9 MMOL/L — SIGNIFICANT CHANGE UP (ref 3.5–5)
POTASSIUM SERPL-SCNC: 3.9 MMOL/L — SIGNIFICANT CHANGE UP (ref 3.5–5)
SODIUM SERPL-SCNC: 140 MMOL/L — SIGNIFICANT CHANGE UP (ref 135–146)

## 2018-12-26 RX ORDER — FLUTICASONE PROPIONATE 50 MCG
1 SPRAY, SUSPENSION NASAL
Qty: 0 | Refills: 0 | Status: DISCONTINUED | OUTPATIENT
Start: 2018-12-26 | End: 2018-12-27

## 2018-12-26 RX ADMIN — Medication 1 SPRAY(S): at 17:12

## 2018-12-26 RX ADMIN — Medication 75 MILLIGRAM(S): at 17:12

## 2018-12-26 RX ADMIN — Medication 75 MILLIGRAM(S): at 05:39

## 2018-12-26 RX ADMIN — ALBUTEROL 10 MILLIGRAM(S): 90 AEROSOL, METERED ORAL at 08:55

## 2018-12-26 RX ADMIN — ENOXAPARIN SODIUM 40 MILLIGRAM(S): 100 INJECTION SUBCUTANEOUS at 05:39

## 2018-12-26 NOTE — PROGRESS NOTE ADULT - ASSESSMENT
This is a 57 YO M, [facility] 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, on NIV at home (CPAP/BiPAP/AVAPS?) presents with fevers, lethargy, decreased appetite, and respiratory distress      # Acute on Chronic Hypoxic respiratory failure due to Influenza A with LLL Pneumonia- resolving  - Blood cultures neg  - Mild Leukocytosis resolved  - c/w BIPAP PRN  - monitor oxygen sat, maintain>92  - C/w Tamiflu, Levaquin  - Keep SpO2 >92%    # Hypokalemia- resolved    # Limb-Girdle Muscular Dystrophy with quadriplegia -stable    #DVT prophylaxis - Lovenox    FULL CODE

## 2018-12-26 NOTE — PROGRESS NOTE ADULT - ASSESSMENT
This is a 57 YO M, [facility] 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, on NIV at home (CPAP/BiPAP/AVAPS?) presents with fevers, lethargy, decreased appetite, and respiratory distress      # Acute on Chronic Hypoxic respiratory failure sec to Influenza A with pneumonia- resolving  - Blood cultures neg  - BIPAP prn  - monitor oxygen sat, maintain>92  - C/w Tamiflu, levaquin  - pt evaluated by pulmonary    # Hypokalemia- resolved    # Limb-Girdle Muscular Dystrophy with quadriplegia -stable    #DVT prophylaxis .

## 2018-12-27 VITALS
DIASTOLIC BLOOD PRESSURE: 66 MMHG | RESPIRATION RATE: 18 BRPM | HEART RATE: 97 BPM | TEMPERATURE: 98 F | SYSTOLIC BLOOD PRESSURE: 115 MMHG

## 2018-12-27 LAB
ANION GAP SERPL CALC-SCNC: 11 MMOL/L — SIGNIFICANT CHANGE UP (ref 7–14)
BASOPHILS # BLD AUTO: 0 K/UL — SIGNIFICANT CHANGE UP (ref 0–0.2)
BASOPHILS # BLD AUTO: 0 K/UL — SIGNIFICANT CHANGE UP (ref 0–0.2)
BASOPHILS NFR BLD AUTO: 0 % — SIGNIFICANT CHANGE UP (ref 0–1)
BASOPHILS NFR BLD AUTO: 0 % — SIGNIFICANT CHANGE UP (ref 0–1)
BUN SERPL-MCNC: 6 MG/DL — LOW (ref 10–20)
CALCIUM SERPL-MCNC: 8.2 MG/DL — LOW (ref 8.5–10.1)
CHLORIDE SERPL-SCNC: 97 MMOL/L — LOW (ref 98–110)
CO2 SERPL-SCNC: 33 MMOL/L — HIGH (ref 17–32)
CREAT SERPL-MCNC: <0.5 MG/DL — LOW (ref 0.7–1.5)
EOSINOPHIL # BLD AUTO: 0 K/UL — SIGNIFICANT CHANGE UP (ref 0–0.7)
EOSINOPHIL # BLD AUTO: 0.01 K/UL — SIGNIFICANT CHANGE UP (ref 0–0.7)
EOSINOPHIL NFR BLD AUTO: 0 % — SIGNIFICANT CHANGE UP (ref 0–8)
EOSINOPHIL NFR BLD AUTO: 0.4 % — SIGNIFICANT CHANGE UP (ref 0–8)
GLUCOSE SERPL-MCNC: 85 MG/DL — SIGNIFICANT CHANGE UP (ref 70–99)
HCT VFR BLD CALC: 37.6 % — LOW (ref 42–52)
HCT VFR BLD CALC: 40.3 % — LOW (ref 42–52)
HGB BLD-MCNC: 12.4 G/DL — LOW (ref 14–18)
HGB BLD-MCNC: 13 G/DL — LOW (ref 14–18)
IMM GRANULOCYTES NFR BLD AUTO: 0.7 % — HIGH (ref 0.1–0.3)
IMM GRANULOCYTES NFR BLD AUTO: 0.9 % — HIGH (ref 0.1–0.3)
LYMPHOCYTES # BLD AUTO: 0.41 K/UL — LOW (ref 1.2–3.4)
LYMPHOCYTES # BLD AUTO: 0.7 K/UL — LOW (ref 1.2–3.4)
LYMPHOCYTES # BLD AUTO: 18.1 % — LOW (ref 20.5–51.1)
LYMPHOCYTES # BLD AUTO: 26.1 % — SIGNIFICANT CHANGE UP (ref 20.5–51.1)
MAGNESIUM SERPL-MCNC: 1.9 MG/DL — SIGNIFICANT CHANGE UP (ref 1.8–2.4)
MCHC RBC-ENTMCNC: 28.5 PG — SIGNIFICANT CHANGE UP (ref 27–31)
MCHC RBC-ENTMCNC: 29 PG — SIGNIFICANT CHANGE UP (ref 27–31)
MCHC RBC-ENTMCNC: 32.3 G/DL — SIGNIFICANT CHANGE UP (ref 32–37)
MCHC RBC-ENTMCNC: 33 G/DL — SIGNIFICANT CHANGE UP (ref 32–37)
MCV RBC AUTO: 88.1 FL — SIGNIFICANT CHANGE UP (ref 80–94)
MCV RBC AUTO: 88.4 FL — SIGNIFICANT CHANGE UP (ref 80–94)
MONOCYTES # BLD AUTO: 0.32 K/UL — SIGNIFICANT CHANGE UP (ref 0.1–0.6)
MONOCYTES # BLD AUTO: 0.34 K/UL — SIGNIFICANT CHANGE UP (ref 0.1–0.6)
MONOCYTES NFR BLD AUTO: 12.7 % — HIGH (ref 1.7–9.3)
MONOCYTES NFR BLD AUTO: 14.2 % — HIGH (ref 1.7–9.3)
NEUTROPHILS # BLD AUTO: 1.51 K/UL — SIGNIFICANT CHANGE UP (ref 1.4–6.5)
NEUTROPHILS # BLD AUTO: 1.61 K/UL — SIGNIFICANT CHANGE UP (ref 1.4–6.5)
NEUTROPHILS NFR BLD AUTO: 60.1 % — SIGNIFICANT CHANGE UP (ref 42.2–75.2)
NEUTROPHILS NFR BLD AUTO: 66.8 % — SIGNIFICANT CHANGE UP (ref 42.2–75.2)
NRBC # BLD: 0 /100 WBCS — SIGNIFICANT CHANGE UP (ref 0–0)
NRBC # BLD: 0 /100 WBCS — SIGNIFICANT CHANGE UP (ref 0–0)
PHOSPHATE SERPL-MCNC: 2.2 MG/DL — SIGNIFICANT CHANGE UP (ref 2.1–4.9)
PLATELET # BLD AUTO: 76 K/UL — LOW (ref 130–400)
PLATELET # BLD AUTO: 84 K/UL — LOW (ref 130–400)
POTASSIUM SERPL-MCNC: 3.3 MMOL/L — LOW (ref 3.5–5)
POTASSIUM SERPL-SCNC: 3.3 MMOL/L — LOW (ref 3.5–5)
RBC # BLD: 4.27 M/UL — LOW (ref 4.7–6.1)
RBC # BLD: 4.56 M/UL — LOW (ref 4.7–6.1)
RBC # FLD: 13.3 % — SIGNIFICANT CHANGE UP (ref 11.5–14.5)
RBC # FLD: 13.4 % — SIGNIFICANT CHANGE UP (ref 11.5–14.5)
SODIUM SERPL-SCNC: 141 MMOL/L — SIGNIFICANT CHANGE UP (ref 135–146)
WBC # BLD: 2.26 K/UL — LOW (ref 4.8–10.8)
WBC # BLD: 2.68 K/UL — LOW (ref 4.8–10.8)
WBC # FLD AUTO: 2.26 K/UL — LOW (ref 4.8–10.8)
WBC # FLD AUTO: 2.68 K/UL — LOW (ref 4.8–10.8)

## 2018-12-27 RX ORDER — FLUTICASONE PROPIONATE 50 MCG
1 SPRAY, SUSPENSION NASAL
Qty: 0 | Refills: 0 | COMMUNITY
Start: 2018-12-27

## 2018-12-27 RX ORDER — CIPROFLOXACIN LACTATE 400MG/40ML
1 VIAL (ML) INTRAVENOUS
Qty: 4 | Refills: 0 | OUTPATIENT
Start: 2018-12-27 | End: 2018-12-30

## 2018-12-27 RX ORDER — POTASSIUM CHLORIDE 20 MEQ
40 PACKET (EA) ORAL ONCE
Qty: 0 | Refills: 0 | Status: COMPLETED | OUTPATIENT
Start: 2018-12-27 | End: 2018-12-27

## 2018-12-27 RX ADMIN — ENOXAPARIN SODIUM 40 MILLIGRAM(S): 100 INJECTION SUBCUTANEOUS at 06:02

## 2018-12-27 RX ADMIN — ALBUTEROL 10 MILLIGRAM(S): 90 AEROSOL, METERED ORAL at 07:34

## 2018-12-27 RX ADMIN — Medication 40 MILLIEQUIVALENT(S): at 11:17

## 2018-12-27 RX ADMIN — Medication 1 SPRAY(S): at 06:02

## 2018-12-27 RX ADMIN — Medication 75 MILLIGRAM(S): at 06:02

## 2018-12-27 NOTE — PROGRESS NOTE ADULT - ATTENDING COMMENTS
Pt seen and examined at bedside independently, pt is comfortable at rest, he is using BIPAP intermittently, otherwise comfortable and asking about discharge. I agree with resident's findings, assessment and plan above. Pt was admitted for acute on chronic respiratory failure, URI due to Influenza A, he is on droplet precautions, Tamiflu, nebulizers, ABx.  Pt clinically improved, will get repeat CXR and CBC and plan discharge home.   At home pt has all equipment from before. Pt seen and examined at bedside independently, pt is comfortable at rest, he is using BIPAP intermittently, otherwise comfortable and asking about discharge. I agree with resident's findings, assessment and plan above. Pt was admitted for acute on chronic respiratory failure, URI due to Influenza A, he is on droplet precautions, Tamiflu, nebulizers, ABx.  Pt clinically improved, will get repeat CXR f/u repeat CBC, replete potassium and plan discharge home.   At home pt has all equipment from before.

## 2018-12-27 NOTE — PHYSICAL THERAPY INITIAL EVALUATION ADULT - PERTINENT HX OF CURRENT PROBLEM, REHAB EVAL
Patient is  58 yomale to ED with fever and respiratory distress with HX of limb girdle MD,on home NIV

## 2018-12-27 NOTE — PROGRESS NOTE ADULT - ASSESSMENT
IMPRESSION:  Influenza +  LLL PNA  Muscular Dystrophy    Recommendations:  Continue oselatamivir 5 days , levaquin total 7 days   follow [facility] neutropenia  already has bipap at home said been followed by dr Fabiana fry before also at [place]   need to repeat cxr as outpatient in 4-5 weeks    nebulizer q 4 HRS AND PRN   FOLLOW CX   RECALL PRN

## 2018-12-27 NOTE — DISCHARGE NOTE ADULT - HOSPITAL COURSE
This is a 59 YO M, [facility] 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, on NIV at home (CPAP/BiPAP/AVAPS?) presents with fevers, lethargy, decreased appetite, and respiratory distress. Symptoms has improved since admission, patient is saturation at 95% on room ambient air, he reports feeling stronger and will be discharged on Tamiflu and Levaquin for a total of 5 days    # Acute on Chronic Hypoxic respiratory failure due to Influenza A with LLL Pneumonia- resolving  - Blood cultures neg  - Mild Leukocytosis resolved  - c/w BIPAP PRN  - monitor oxygen sat, maintain>92  - C/w Tamiflu, Levaquin  - Keep SpO2 >92%    # Hypokalemia- repleted today    # Limb-Girdle Muscular Dystrophy with quadriplegia -stable    #DVT prophylaxis - Lovenox This is a 57 YO M, [facility] 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, on NIV at home (CPAP/BiPAP/AVAPS?) presents with fevers, lethargy, decreased appetite, and respiratory distress. Symptoms has improved since admission, patient is saturation at 95% on room ambient air, he reports feeling stronger and will be discharged on Tamiflu and Levaquin for a total of 5 days    # Acute on Chronic Hypoxic respiratory failure due to Influenza A with LLL Pneumonia- resolving  - Blood cultures neg  - Mild Leukocytosis resolved  - c/w BIPAP PRN  - monitor oxygen sat, maintain>92  - C/w Tamiflu, Levaquin  - Keep SpO2 >92%    # Hypokalemia- repleted today  - Repeat BMP in 5-7 days as outpatient    # Lymphocytopenia and Thrombocytopenia  - Likely viral etiology  - Thrombocytopenia less likely HIT (Lovenox and only 27% drop of Platelets)  - Repeat CBC in 5-7 days as outpatient    # Limb-Girdle Muscular Dystrophy with quadriplegia -stable    #DVT prophylaxis - Lovenox

## 2018-12-27 NOTE — DISCHARGE NOTE ADULT - PATIENT PORTAL LINK FT
You can access the [facility] Patient Portal, offered by [facility], by registering with the following website: [email or web address]

## 2018-12-27 NOTE — DISCHARGE NOTE ADULT - MEDICATION SUMMARY - MEDICATIONS TO TAKE
I will START or STAY ON the medications listed below when I get home from the hospital:    oseltamivir 75 mg oral capsule  -- 1 cap(s) by mouth 2 times a day  -- Indication: For INFLUENZA A    albuterol  -- 10 milligram(s) by mouth once a day  -- Indication: For Bronchospasm    fluticasone 50 mcg/inh nasal spray  -- 1 spray(s) into nose 2 times a day  -- Indication: For Allergies    levoFLOXacin 750 mg oral tablet  -- 1 tab by mouth every 24 hours  -- Indication: For Antibiotics

## 2018-12-27 NOTE — PHYSICAL THERAPY INITIAL EVALUATION ADULT - PASSIVE RANGE OF MOTION EXAMINATION, REHAB EVAL
Patient demo limitations in flexibility B hands , tigghtness B hams trings/bilateral lower extremity Passive ROM was WFL (within functional limits)/bilateral upper extremity Passive ROM was WFL (within functional limits)

## 2018-12-27 NOTE — DISCHARGE NOTE ADULT - CARE PLAN
Principal Discharge DX:	Acute on chronic respiratory failure with hypoxia  Goal:	Resolution  Assessment and plan of treatment:	You were admitted to the hospital for acute on chronic respiratory failure due to influenza with overlying left lower lobe pneumonia  - your symptoms improved on intermittent BiPAP, Antibiotics and Tamiflu  - Continue with Levaquin and Tamiflu for 2 more days  - Continue with your noninvasive ventilation at home as needed  - Follow up with your primary care doctor and pulmonologist within 1 week Principal Discharge DX:	Acute on chronic respiratory failure with hypoxia  Goal:	Resolution  Assessment and plan of treatment:	You were admitted to the hospital for acute on chronic respiratory failure due to influenza with overlying left lower lobe pneumonia  - your symptoms improved on intermittent BiPAP, Antibiotics and Tamiflu  - Continue with Levaquin for 4 more days and Tamiflu for 2 more days  - Continue with your noninvasive ventilation at home as needed  - Follow up with your primary care doctor and pulmonologist within 1 week  Secondary Diagnosis:	Lymphocytopenia  Goal:	Resolution  Assessment and plan of treatment:	You were noted to develop low white blood cells and low platelets in the hospital. These are all seen sometimes in viral infections. Given that you had normal blood work recently, we would recommend that you follow up with your primary care doctor in 5-7 days to repeat CBC as well as BMP for low potassium

## 2018-12-27 NOTE — DISCHARGE NOTE ADULT - PLAN OF CARE
Resolution You were admitted to the hospital for acute on chronic respiratory failure due to influenza with overlying left lower lobe pneumonia  - your symptoms improved on intermittent BiPAP, Antibiotics and Tamiflu  - Continue with Levaquin and Tamiflu for 2 more days  - Continue with your noninvasive ventilation at home as needed  - Follow up with your primary care doctor and pulmonologist within 1 week You were admitted to the hospital for acute on chronic respiratory failure due to influenza with overlying left lower lobe pneumonia  - your symptoms improved on intermittent BiPAP, Antibiotics and Tamiflu  - Continue with Levaquin for 4 more days and Tamiflu for 2 more days  - Continue with your noninvasive ventilation at home as needed  - Follow up with your primary care doctor and pulmonologist within 1 week You were noted to develop low white blood cells and low platelets in the hospital. These are all seen sometimes in viral infections. Given that you had normal blood work recently, we would recommend that you follow up with your primary care doctor in 5-7 days to repeat CBC as well as BMP for low potassium

## 2018-12-27 NOTE — DISCHARGE NOTE ADULT - CARE PROVIDER_API CALL
Robert Potts (), Internal Medicine  [address] [place] [address]  Phone: [phone]  Fax: [phone]    Belle Jung), Internal Medicine  [address] [place] [address]  Phone: [phone]  Fax: [phone]

## 2018-12-27 NOTE — DISCHARGE NOTE ADULT - ADDITIONAL INSTRUCTIONS
Primary Care Doctor Dr. Potts within 5-7 days (repeat CBC and BMP)  Pulmonologist Dr. Belle Jung in 1 weeks

## 2018-12-27 NOTE — PROGRESS NOTE ADULT - SUBJECTIVE AND OBJECTIVE BOX
Patient is a 58y old  Male who presents with a chief complaint of Fever, Respiratory distress (25 Dec 2018 10:38)    Patient was seen and examined.  Doing better  Sob at baseline  Pt states he had an anxiety attack this AM  Denies any other complaints.    PAST MEDICAL & SURGICAL HISTORY:  Muscular dystrophy  No significant past surgical history    Allergies  No Known Allergies    MEDICATIONS  (STANDING):  ALBUTerol  Tablet 10 milliGRAM(s) Oral <User Schedule>  enoxaparin Injectable 40 milliGRAM(s) SubCutaneous every 24 hours  fluticasone propionate 50 MICROgram(s)/spray Nasal Spray 1 Spray(s) Both Nostrils two times a day  influenza   Vaccine 0.5 milliLiter(s) IntraMuscular once  levoFLOXacin  Tablet 750 milliGRAM(s) Oral every 24 hours  oseltamivir 75 milliGRAM(s) Oral two times a day    MEDICATIONS  (PRN):  acetaminophen   Tablet .. 650 milliGRAM(s) Oral every 6 hours PRN Temp greater or equal to 38C (100.4F)    Vital Signs Last 24 Hrs  T(C): 37.6  T(F): 99.7  HR: 82  BP: 105/63  BP(mean): 79  RR: 18  SpO2: 98%    O/E:  Awake, alert, cachectic  HEENT: atraumatic, EOMI.  Chest: clear.  CVS: SIS2 +, no murmur.  P/A: Soft, BS+  CNS: quadriplegic.  Ext: no edema feet.  Skin: no rash, no ulcers.  All systems reviewed positive findings as above.  CAPILLARY BLOOD GLUCOSE    POCT Blood Glucose.: 108 mg/dL (25 Dec 2018 16:14)                          11.9<L>  8.81  )-----------( 83<L>    ( 25 Dec 2018 05:31 )             36.7<L>                        13.8<L>  11.64<H> )-----------( 103<L>    ( 24 Dec 2018 14:30 )             42.1         140  |  98  |  9<L>  ----------------------------<  76  3.9   |  31  |  <0.5<L>      141  |  97<L>  |  6<L>  ----------------------------<  59<L>  3.1<L>   |  25  |  <0.5<L>    Ca    8.1<L>      26 Dec 2018 05:33  Ca    7.9<L>      25 Dec 2018 05:31  Ca    8.4<L>      24 Dec 2018 14:30  Mg     1.8     12-    TPro  6.3  /  Alb  3.6  /  TBili  0.3  /  DBili  x   /  AST  30  /  ALT  22  /  AlkPhos  60  12-24      CARDIAC MARKERS ( 24 Dec 2018 14:30 )  x     / <0.01 ng/mL / x     / x     / x          PT/INR - ( 24 Dec 2018 14:30 )   PT: 12.30 sec;   INR: 1.07 ratio         PTT - ( 24 Dec 2018 14:30 )  PTT:39.2 sec  Urinalysis Basic - ( 24 Dec 2018 17:00 )    Color: Yellow / Appearance: Cloudy / S.015 / pH: x  Gluc: x / Ketone: >=80  / Bili: Negative / Urobili: 0.2 mg/dL   Blood: x / Protein: Negative mg/dL / Nitrite: Negative   Leuk Esterase: Negative / RBC: 6-10 /HPF / WBC 3-5 /HPF   Sq Epi: x / Non Sq Epi: x / Bacteria: Few /HPF        Culture - Urine (collected 24 Dec 2018 17:00)  Source: .Urine Clean Catch (Midstream)  Final Report (25 Dec 2018 23:29):    No growth    Culture - Blood (collected 24 Dec 2018 14:09)  Source: .Blood Blood-Peripheral  Preliminary Report (25 Dec 2018 23:01):    No growth to date.    Culture - Blood (collected 24 Dec 2018 14:09)  Source: .Blood Blood-Peripheral  Preliminary Report (25 Dec 2018 23:01):    No growth to date.
Patient is a 58y old  Male who presents with a chief complaint of Fever, Respiratory distress (27 Dec 2018 11:49)      INTERVAL HISTORY/overnight events seen this  morning at 8 am   over all feel better more energetic no cough no fever         Vital Signs Last 24 Hrs  T(C): 36.7 (27 Dec 2018 14:49), Max: 36.7 (27 Dec 2018 14:49)  T(F): 98 (27 Dec 2018 14:49), Max: 98 (27 Dec 2018 14:49)  HR: 97 (27 Dec 2018 14:49) (73 - 97)  BP: 115/66 (27 Dec 2018 14:49) (98/57 - 115/66)  BP(mean): --  RR: 18 (27 Dec 2018 14:49) (18 - 20)  SpO2: 95% (27 Dec 2018 09:59) (95% - 95%)  Daily     Daily   I&O's Summary    26 Dec 2018 07:01  -  27 Dec 2018 07:00  --------------------------------------------------------  IN: 0 mL / OUT: 1500 mL / NET: -1500 mL    27 Dec 2018 07:01  -  27 Dec 2018 15:04  --------------------------------------------------------  IN: 0 mL / OUT: 1220 mL / NET: -1220 mL        Physical Examination:    General: No acute distress.  Alert, oriented, interactive, nonfocal    HEENT: Pupils equal, reactive to light.  Symmetric.    PULM: Clear to auscultation bilaterally, no significant sputum production    CVS: Regular rate and rhythm, no murmurs, rubs, or gallops    ABD: Soft, nondistended, nontender, normoactive bowel sounds, no masses    EXT: No edema, nontender    SKIN: Warm and well perfused, no rashes noted.    Neurology: quadriplegic     Musculoskeletal : Move all extremety         Lab Results:                        13.0   2.26  )-----------( 76       ( 27 Dec 2018 11:50 )             40.3     27 Dec 2018 08:01    141    |  97     |  6      ----------------------------<  85     3.3     |  33     |  <0.5     Ca    8.2        27 Dec 2018 08:01  Phos  2.2       27 Dec 2018 08:01  Mg     1.9       27 Dec 2018 08:01                Microbiology    Culture - Urine (collected 24 Dec 2018 17:00)  Source: .Urine Clean Catch (Midstream)  Final Report (25 Dec 2018 23:29):    No growth        Medication:  MEDICATIONS  (STANDING):  ALBUTerol  Tablet 10 milliGRAM(s) Oral <User Schedule>  enoxaparin Injectable 40 milliGRAM(s) SubCutaneous every 24 hours  fluticasone propionate 50 MICROgram(s)/spray Nasal Spray 1 Spray(s) Both Nostrils two times a day  levoFLOXacin  Tablet 750 milliGRAM(s) Oral every 24 hours  oseltamivir 75 milliGRAM(s) Oral two times a day    MEDICATIONS  (PRN):  acetaminophen   Tablet .. 650 milliGRAM(s) Oral every 6 hours PRN Temp greater or equal to 38C (100.4F)        IMAGING STUDIES:  ?decrease left lower opacity
SUBJECTIVE:    Patient is a 58y old Male who presents with a chief complaint of Fever, Respiratory distress (26 Dec 2018 12:44)    Currently admitted to medicine with the primary diagnosis of Influenza A     Today is hospital day 2d. This morning he is resting comfortably in bed and reports no new issues or overnight events. Patient reports feeling stronger today with an improved appetite and breathing is better too. Patient is using accessory muscles but it is reported that this is the same as his baseline    PAST MEDICAL & SURGICAL HISTORY  Muscular dystrophy  No significant past surgical history    ALLERGIES:  No Known Allergies    MEDICATIONS:  STANDING MEDICATIONS  ALBUTerol  Tablet 10 milliGRAM(s) Oral <User Schedule>  enoxaparin Injectable 40 milliGRAM(s) SubCutaneous every 24 hours  fluticasone propionate 50 MICROgram(s)/spray Nasal Spray 1 Spray(s) Both Nostrils two times a day  influenza   Vaccine 0.5 milliLiter(s) IntraMuscular once  levoFLOXacin  Tablet 750 milliGRAM(s) Oral every 24 hours  oseltamivir 75 milliGRAM(s) Oral two times a day    PRN MEDICATIONS  acetaminophen   Tablet .. 650 milliGRAM(s) Oral every 6 hours PRN    VITALS:   T(F): 99.7  HR: 82  BP: 105/63  RR: 18  SpO2: 98%    LABS:                        11.9   8.81  )-----------( 83       ( 25 Dec 2018 05:31 )             36.7     12-    140  |  98  |  9<L>  ----------------------------<  76  3.9   |  31  |  <0.5<L>    Ca    8.1<L>      26 Dec 2018 05:33  Mg     1.8     12-    TPro  6.3  /  Alb  3.6  /  TBili  0.3  /  DBili  x   /  AST  30  /  ALT  22  /  AlkPhos  60  12-24    PT/INR - ( 24 Dec 2018 14:30 )   PT: 12.30 sec;   INR: 1.07 ratio         PTT - ( 24 Dec 2018 14:30 )  PTT:39.2 sec  Urinalysis Basic - ( 24 Dec 2018 17:00 )    Color: Yellow / Appearance: Cloudy / S.015 / pH: x  Gluc: x / Ketone: >=80  / Bili: Negative / Urobili: 0.2 mg/dL   Blood: x / Protein: Negative mg/dL / Nitrite: Negative   Leuk Esterase: Negative / RBC: 6-10 /HPF / WBC 3-5 /HPF   Sq Epi: x / Non Sq Epi: x / Bacteria: Few /HPF      ABG - ( 24 Dec 2018 18:30 )  pH, Arterial: 7.54  pH, Blood: x     /  pCO2: 32    /  pO2: 179   / HCO3: 27    / Base Excess: 4.8   /  SaO2: 100                   Culture - Urine (collected 24 Dec 2018 17:00)  Source: .Urine Clean Catch (Midstream)  Final Report (25 Dec 2018 23:29):    No growth    Culture - Blood (collected 24 Dec 2018 14:09)  Source: .Blood Blood-Peripheral  Preliminary Report (25 Dec 2018 23:01):    No growth to date.    Culture - Blood (collected 24 Dec 2018 14:09)  Source: .Blood Blood-Peripheral  Preliminary Report (25 Dec 2018 23:01):    No growth to date.      CARDIAC MARKERS ( 24 Dec 2018 14:30 )  x     / <0.01 ng/mL / x     / x     / x          RADIOLOGY:    < from: Xray Chest 1 View- PORTABLE-Routine (18 @ 07:48) >  Impression:      New left lung base opacity. Follow to resolution    < end of copied text >    PHYSICAL EXAM:  GEN: No acute distress  LUNGS: Clear to auscultation bilaterally   HEART: Regular rate and rhythm, S1, S2. No murmurs  ABD: Soft, non-tender, non-distended, +BS  EXT: NC/NC, 2+ KYM, quadriplegic   NEURO: AAOX3, quadriplegic     Intravenous access: PIV    [place]jo Catheter:   Indwelling Urethral Catheter:     Connect To:  Straight Drainage/Gravity    Indication:  Urine Output Monitoring in Critically Ill (18 @ 14:09) (not performed) [active]
SUBJECTIVE:    Patient is a 58y old Male who presents with a chief complaint of Fever, Respiratory distress (26 Dec 2018 13:27)    Currently admitted to medicine with the primary diagnosis of Influenza A     Today is hospital day 3d. This morning he is resting comfortably in bed and reports no new issues or overnight events. Patient is reporting feeling stronger since he got admitted. Overall improving on BiPAP PRN and currently saturating 95% on ambient room air.    PAST MEDICAL & SURGICAL HISTORY  Muscular dystrophy  No significant past surgical history    ALLERGIES:  No Known Allergies    MEDICATIONS:  STANDING MEDICATIONS  ALBUTerol  Tablet 10 milliGRAM(s) Oral <User Schedule>  enoxaparin Injectable 40 milliGRAM(s) SubCutaneous every 24 hours  fluticasone propionate 50 MICROgram(s)/spray Nasal Spray 1 Spray(s) Both Nostrils two times a day  influenza   Vaccine 0.5 milliLiter(s) IntraMuscular once  levoFLOXacin  Tablet 750 milliGRAM(s) Oral every 24 hours  oseltamivir 75 milliGRAM(s) Oral two times a day  potassium chloride    Tablet ER 40 milliEquivalent(s) Oral once    PRN MEDICATIONS  acetaminophen   Tablet .. 650 milliGRAM(s) Oral every 6 hours PRN    VITALS:   T(F): 97.3  HR: 73  BP: 107/68  RR: 18  SpO2: 95%    LABS:                        12.4   2.68  )-----------( 84       ( 27 Dec 2018 08:01 )             37.6     12-27    141  |  97<L>  |  6<L>  ----------------------------<  85  3.3<L>   |  33<H>  |  <0.5<L>    Ca    8.2<L>      27 Dec 2018 08:01  Phos  2.2     12-27  Mg     1.9     12-27                Culture - Urine (collected 24 Dec 2018 17:00)  Source: .Urine Clean Catch (Midstream)  Final Report (25 Dec 2018 23:29):    No growth    Culture - Blood (collected 24 Dec 2018 14:09)  Source: .Blood Blood-Peripheral  Preliminary Report (25 Dec 2018 23:01):    No growth to date.    Culture - Blood (collected 24 Dec 2018 14:09)  Source: .Blood Blood-Peripheral  Preliminary Report (25 Dec 2018 23:01):    No growth to date.    RADIOLOGY:    PHYSICAL EXAM:  GEN: No acute distress  PULM: Clear to auscultation bilaterally   CVS: Regular rate and rhythm, S1, S2. No murmurs  ABD: Soft, non-tender, non-distended, +BS  EXT: NC/NC, 2+ KYM, quadriplegic   NEURO: AAOX3, quadriplegic     Intravenous access: PIV    [place]jo Catheter:   Indwelling Urethral Catheter:     Connect To:  Straight Drainage/Gravity    Indication:  Urine Output Monitoring in Critically Ill (12-24-18 @ 14:09) (not performed) [active]

## 2018-12-27 NOTE — PROGRESS NOTE ADULT - ASSESSMENT
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, on NIV at home (CPAP/BiPAP/AVAPS?) presents with fevers, lethargy, decreased appetite, and respiratory distress    # Acute on Chronic Hypoxic respiratory failure due to Influenza A with LLL Pneumonia- resolving  - Blood cultures neg  - Mild Leukocytosis resolved  - c/w BIPAP PRN  - monitor oxygen sat, maintain>92  - C/w Tamiflu, Levaquin  - Keep SpO2 >92%    # Hypokalemia- repleted today    # Limb-Girdle Muscular Dystrophy with quadriplegia -stable    #DVT prophylaxis - Lovenox    FULL CODE This is a 57 YO M, [facility] 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, on NIV at home (CPAP/BiPAP/AVAPS?) presents with fevers, lethargy, decreased appetite, and respiratory distress    # Acute on Chronic Hypoxic respiratory failure due to Influenza A with LLL Pneumonia- resolving  - Blood cultures neg  - Mild Leukocytosis resolved  - c/w BIPAP PRN  - monitor oxygen sat, maintain>92  - C/w Tamiflu, Levaquin  - Keep SpO2 >92%    # Hypokalemia- repleted today    # Limb-Girdle Muscular Dystrophy with quadriplegia -stable    #DVT prophylaxis - Lovenox    FULL CODE    ==============================    <<<RESIDENT DISCHARGE NOTE>>>     NIDAMAXWELL OLIVO  MRN-[id number]    VITAL SIGNS:  T(F): 97.3 (12-27-18 @ 06:06), Max: 97.5 (12-26-18 @ 22:05)  HR: 73 (12-27-18 @ 06:06)  BP: 107/68 (12-27-18 @ 06:06)  SpO2: 95% (12-27-18 @ 09:59)    TEST RESULTS:                        12.4   2.68  )-----------( 84       ( 27 Dec 2018 08:01 )             37.6       12-27    141  |  97<L>  |  6<L>  ----------------------------<  85  3.3<L>   |  33<H>  |  <0.5<L>    Ca    8.2<L>      27 Dec 2018 08:01  Phos  2.2     12-27  Mg     1.9     12-27        FINAL DISCHARGE INTERVIEW:  Resident(s) Present: Name: EVA Galarza, RN Present: (Name: Maxwell)    DISCHARGE MEDICATION RECONCILIATION  reviewed with Attending (Name: Dr. Castanon)    DISPOSITION:   [ X ] Home,    [  ] Home with Visiting Nursing Services,   [    ]  SNF/ NH,    [   ] Acute Rehab (4A),   [   ] Other (Specify:_________)

## 2018-12-29 LAB
CULTURE RESULTS: SIGNIFICANT CHANGE UP
CULTURE RESULTS: SIGNIFICANT CHANGE UP
SPECIMEN SOURCE: SIGNIFICANT CHANGE UP
SPECIMEN SOURCE: SIGNIFICANT CHANGE UP

## 2019-01-02 DIAGNOSIS — R50.9 FEVER, UNSPECIFIED: ICD-10-CM

## 2019-01-02 DIAGNOSIS — R64 CACHEXIA: ICD-10-CM

## 2019-01-02 DIAGNOSIS — G71.09 OTHER SPECIFIED MUSCULAR DYSTROPHIES: ICD-10-CM

## 2019-01-02 DIAGNOSIS — E87.6 HYPOKALEMIA: ICD-10-CM

## 2019-01-02 DIAGNOSIS — Z99.81 DEPENDENCE ON SUPPLEMENTAL OXYGEN: ICD-10-CM

## 2019-01-02 DIAGNOSIS — D69.6 THROMBOCYTOPENIA, UNSPECIFIED: ICD-10-CM

## 2019-01-02 DIAGNOSIS — D72.810 LYMPHOCYTOPENIA: ICD-10-CM

## 2019-01-02 DIAGNOSIS — F41.0 PANIC DISORDER [EPISODIC PAROXYSMAL ANXIETY]: ICD-10-CM

## 2019-01-02 DIAGNOSIS — J10.00 INFLUENZA DUE TO OTHER IDENTIFIED INFLUENZA VIRUS WITH UNSPECIFIED TYPE OF PNEUMONIA: ICD-10-CM

## 2019-01-02 DIAGNOSIS — J96.21 ACUTE AND CHRONIC RESPIRATORY FAILURE WITH HYPOXIA: ICD-10-CM

## 2019-01-02 DIAGNOSIS — G82.50 QUADRIPLEGIA, UNSPECIFIED: ICD-10-CM

## 2019-01-02 DIAGNOSIS — D72.819 DECREASED WHITE BLOOD CELL COUNT, UNSPECIFIED: ICD-10-CM

## 2019-01-02 DIAGNOSIS — D72.829 ELEVATED WHITE BLOOD CELL COUNT, UNSPECIFIED: ICD-10-CM

## 2023-05-30 NOTE — ED PROVIDER NOTE - TOBACCO USE
Unknown if ever smoked Isotretinoin Pregnancy And Lactation Text: This medication is Pregnancy Category X and is considered extremely dangerous during pregnancy. It is unknown if it is excreted in breast milk.

## 2024-01-01 ENCOUNTER — RESULT REVIEW (OUTPATIENT)
Age: 64
End: 2024-01-01

## 2024-01-01 ENCOUNTER — INPATIENT (INPATIENT)
Facility: HOSPITAL | Age: 64
LOS: 5 days | DRG: 871 | End: 2024-12-01
Attending: INTERNAL MEDICINE | Admitting: INTERNAL MEDICINE
Payer: MEDICARE

## 2024-01-01 VITALS
WEIGHT: 169.98 LBS | HEIGHT: 69 IN | RESPIRATION RATE: 20 BRPM | OXYGEN SATURATION: 93 % | SYSTOLIC BLOOD PRESSURE: 108 MMHG | HEART RATE: 65 BPM | DIASTOLIC BLOOD PRESSURE: 46 MMHG

## 2024-01-01 VITALS — RESPIRATION RATE: 22 BRPM | HEART RATE: 65 BPM | OXYGEN SATURATION: 100 %

## 2024-01-01 DIAGNOSIS — A41.9 SEPSIS, UNSPECIFIED ORGANISM: ICD-10-CM

## 2024-01-01 LAB
ALBUMIN SERPL ELPH-MCNC: 2.5 G/DL — LOW (ref 3.5–5.2)
ALBUMIN SERPL ELPH-MCNC: 2.9 G/DL — LOW (ref 3.5–5.2)
ALBUMIN SERPL ELPH-MCNC: 2.9 G/DL — LOW (ref 3.5–5.2)
ALBUMIN SERPL ELPH-MCNC: 3 G/DL — LOW (ref 3.5–5.2)
ALBUMIN SERPL ELPH-MCNC: 3 G/DL — LOW (ref 3.5–5.2)
ALBUMIN SERPL ELPH-MCNC: 3.3 G/DL — LOW (ref 3.5–5.2)
ALP SERPL-CCNC: 105 U/L — SIGNIFICANT CHANGE UP (ref 30–115)
ALP SERPL-CCNC: 109 U/L — SIGNIFICANT CHANGE UP (ref 30–115)
ALP SERPL-CCNC: 117 U/L — HIGH (ref 30–115)
ALP SERPL-CCNC: 118 U/L — HIGH (ref 30–115)
ALP SERPL-CCNC: 133 U/L — HIGH (ref 30–115)
ALP SERPL-CCNC: 95 U/L — SIGNIFICANT CHANGE UP (ref 30–115)
ALT FLD-CCNC: 19 U/L — SIGNIFICANT CHANGE UP (ref 0–41)
ALT FLD-CCNC: 25 U/L — SIGNIFICANT CHANGE UP (ref 0–41)
ALT FLD-CCNC: 31 U/L — SIGNIFICANT CHANGE UP (ref 0–41)
ALT FLD-CCNC: 35 U/L — SIGNIFICANT CHANGE UP (ref 0–41)
ALT FLD-CCNC: 37 U/L — SIGNIFICANT CHANGE UP (ref 0–41)
ALT FLD-CCNC: 42 U/L — HIGH (ref 0–41)
ANION GAP SERPL CALC-SCNC: 10 MMOL/L — SIGNIFICANT CHANGE UP (ref 7–14)
ANION GAP SERPL CALC-SCNC: 10 MMOL/L — SIGNIFICANT CHANGE UP (ref 7–14)
ANION GAP SERPL CALC-SCNC: 4 MMOL/L — LOW (ref 7–14)
ANION GAP SERPL CALC-SCNC: 5 MMOL/L — LOW (ref 7–14)
ANION GAP SERPL CALC-SCNC: 6 MMOL/L — LOW (ref 7–14)
ANION GAP SERPL CALC-SCNC: 6 MMOL/L — LOW (ref 7–14)
ANION GAP SERPL CALC-SCNC: 9 MMOL/L — SIGNIFICANT CHANGE UP (ref 7–14)
APPEARANCE UR: CLEAR — SIGNIFICANT CHANGE UP
APTT BLD: 37.6 SEC — SIGNIFICANT CHANGE UP (ref 27–39.2)
APTT BLD: 43 SEC — HIGH (ref 27–39.2)
APTT BLD: 49.9 SEC — HIGH (ref 27–39.2)
AST SERPL-CCNC: 13 U/L — SIGNIFICANT CHANGE UP (ref 0–41)
AST SERPL-CCNC: 20 U/L — SIGNIFICANT CHANGE UP (ref 0–41)
AST SERPL-CCNC: 30 U/L — SIGNIFICANT CHANGE UP (ref 0–41)
AST SERPL-CCNC: 32 U/L — SIGNIFICANT CHANGE UP (ref 0–41)
AST SERPL-CCNC: 35 U/L — SIGNIFICANT CHANGE UP (ref 0–41)
AST SERPL-CCNC: 41 U/L — SIGNIFICANT CHANGE UP (ref 0–41)
BASE EXCESS BLDA CALC-SCNC: -2.1 MMOL/L — LOW (ref -2–3)
BASOPHILS # BLD AUTO: 0 K/UL — SIGNIFICANT CHANGE UP (ref 0–0.2)
BASOPHILS # BLD AUTO: 0.01 K/UL — SIGNIFICANT CHANGE UP (ref 0–0.2)
BASOPHILS # BLD AUTO: 0.03 K/UL — SIGNIFICANT CHANGE UP (ref 0–0.2)
BASOPHILS # BLD AUTO: 0.06 K/UL — SIGNIFICANT CHANGE UP (ref 0–0.2)
BASOPHILS NFR BLD AUTO: 0 % — SIGNIFICANT CHANGE UP (ref 0–1)
BASOPHILS NFR BLD AUTO: 0.1 % — SIGNIFICANT CHANGE UP (ref 0–1)
BASOPHILS NFR BLD AUTO: 0.2 % — SIGNIFICANT CHANGE UP (ref 0–1)
BASOPHILS NFR BLD AUTO: 0.3 % — SIGNIFICANT CHANGE UP (ref 0–1)
BASOPHILS NFR BLD AUTO: 0.9 % — SIGNIFICANT CHANGE UP (ref 0–1)
BILIRUB SERPL-MCNC: 0.4 MG/DL — SIGNIFICANT CHANGE UP (ref 0.2–1.2)
BILIRUB SERPL-MCNC: 0.5 MG/DL — SIGNIFICANT CHANGE UP (ref 0.2–1.2)
BILIRUB SERPL-MCNC: 0.6 MG/DL — SIGNIFICANT CHANGE UP (ref 0.2–1.2)
BILIRUB UR-MCNC: NEGATIVE — SIGNIFICANT CHANGE UP
BUN SERPL-MCNC: 10 MG/DL — SIGNIFICANT CHANGE UP (ref 10–20)
BUN SERPL-MCNC: 12 MG/DL — SIGNIFICANT CHANGE UP (ref 10–20)
BUN SERPL-MCNC: 13 MG/DL — SIGNIFICANT CHANGE UP (ref 10–20)
BUN SERPL-MCNC: 17 MG/DL — SIGNIFICANT CHANGE UP (ref 10–20)
BUN SERPL-MCNC: 24 MG/DL — HIGH (ref 10–20)
BUN SERPL-MCNC: 29 MG/DL — HIGH (ref 10–20)
BUN SERPL-MCNC: 9 MG/DL — LOW (ref 10–20)
CALCIUM SERPL-MCNC: 8 MG/DL — LOW (ref 8.4–10.5)
CALCIUM SERPL-MCNC: 8 MG/DL — LOW (ref 8.4–10.5)
CALCIUM SERPL-MCNC: 8.1 MG/DL — LOW (ref 8.4–10.5)
CALCIUM SERPL-MCNC: 8.1 MG/DL — LOW (ref 8.4–10.5)
CALCIUM SERPL-MCNC: 8.3 MG/DL — LOW (ref 8.4–10.4)
CALCIUM SERPL-MCNC: 8.3 MG/DL — LOW (ref 8.4–10.5)
CALCIUM SERPL-MCNC: 8.8 MG/DL — SIGNIFICANT CHANGE UP (ref 8.4–10.5)
CHLORIDE SERPL-SCNC: 110 MMOL/L — SIGNIFICANT CHANGE UP (ref 98–110)
CHLORIDE SERPL-SCNC: 111 MMOL/L — HIGH (ref 98–110)
CHLORIDE SERPL-SCNC: 113 MMOL/L — HIGH (ref 98–110)
CHLORIDE SERPL-SCNC: 113 MMOL/L — HIGH (ref 98–110)
CHLORIDE SERPL-SCNC: 115 MMOL/L — HIGH (ref 98–110)
CHLORIDE SERPL-SCNC: 116 MMOL/L — HIGH (ref 98–110)
CHLORIDE SERPL-SCNC: 118 MMOL/L — HIGH (ref 98–110)
CO2 SERPL-SCNC: 22 MMOL/L — SIGNIFICANT CHANGE UP (ref 17–32)
CO2 SERPL-SCNC: 22 MMOL/L — SIGNIFICANT CHANGE UP (ref 17–32)
CO2 SERPL-SCNC: 25 MMOL/L — SIGNIFICANT CHANGE UP (ref 17–32)
CO2 SERPL-SCNC: 26 MMOL/L — SIGNIFICANT CHANGE UP (ref 17–32)
CO2 SERPL-SCNC: 26 MMOL/L — SIGNIFICANT CHANGE UP (ref 17–32)
CO2 SERPL-SCNC: 27 MMOL/L — SIGNIFICANT CHANGE UP (ref 17–32)
CO2 SERPL-SCNC: 30 MMOL/L — SIGNIFICANT CHANGE UP (ref 17–32)
COLOR SPEC: YELLOW — SIGNIFICANT CHANGE UP
CREAT SERPL-MCNC: <0.5 MG/DL — LOW (ref 0.7–1.5)
CRP SERPL-MCNC: 51.7 MG/L — HIGH
CULTURE RESULTS: SIGNIFICANT CHANGE UP
CULTURE RESULTS: SIGNIFICANT CHANGE UP
D DIMER BLD IA.RAPID-MCNC: 220 NG/ML DDU — SIGNIFICANT CHANGE UP
DIFF PNL FLD: NEGATIVE — SIGNIFICANT CHANGE UP
DIR ANTIGLOB POLYSPECIFIC INTERPRETATION: SIGNIFICANT CHANGE UP
EGFR: 149 ML/MIN/1.73M2 — SIGNIFICANT CHANGE UP
EOSINOPHIL # BLD AUTO: 0 K/UL — SIGNIFICANT CHANGE UP (ref 0–0.7)
EOSINOPHIL # BLD AUTO: 0 K/UL — SIGNIFICANT CHANGE UP (ref 0–0.7)
EOSINOPHIL # BLD AUTO: 0.01 K/UL — SIGNIFICANT CHANGE UP (ref 0–0.7)
EOSINOPHIL # BLD AUTO: 0.05 K/UL — SIGNIFICANT CHANGE UP (ref 0–0.7)
EOSINOPHIL # BLD AUTO: 0.11 K/UL — SIGNIFICANT CHANGE UP (ref 0–0.7)
EOSINOPHIL NFR BLD AUTO: 0 % — SIGNIFICANT CHANGE UP (ref 0–8)
EOSINOPHIL NFR BLD AUTO: 0 % — SIGNIFICANT CHANGE UP (ref 0–8)
EOSINOPHIL NFR BLD AUTO: 0.1 % — SIGNIFICANT CHANGE UP (ref 0–8)
EOSINOPHIL NFR BLD AUTO: 1.5 % — SIGNIFICANT CHANGE UP (ref 0–8)
EOSINOPHIL NFR BLD AUTO: 1.7 % — SIGNIFICANT CHANGE UP (ref 0–8)
ERYTHROCYTE [SEDIMENTATION RATE] IN BLOOD: 39 MM/HR — HIGH (ref 0–10)
FERRITIN SERPL-MCNC: 605 NG/ML — HIGH (ref 30–400)
FIBRINOGEN PPP-MCNC: 525 MG/DL — HIGH (ref 200–435)
FLUAV AG NPH QL: SIGNIFICANT CHANGE UP
FLUBV AG NPH QL: SIGNIFICANT CHANGE UP
FOLATE SERPL-MCNC: 10.2 NG/ML — SIGNIFICANT CHANGE UP
GAS PNL BLDA: SIGNIFICANT CHANGE UP
GLUCOSE BLDC GLUCOMTR-MCNC: 105 MG/DL — HIGH (ref 70–99)
GLUCOSE BLDC GLUCOMTR-MCNC: 201 MG/DL — HIGH (ref 70–99)
GLUCOSE BLDC GLUCOMTR-MCNC: 99 MG/DL — SIGNIFICANT CHANGE UP (ref 70–99)
GLUCOSE SERPL-MCNC: 122 MG/DL — HIGH (ref 70–99)
GLUCOSE SERPL-MCNC: 133 MG/DL — HIGH (ref 70–99)
GLUCOSE SERPL-MCNC: 166 MG/DL — HIGH (ref 70–99)
GLUCOSE SERPL-MCNC: 176 MG/DL — HIGH (ref 70–99)
GLUCOSE SERPL-MCNC: 72 MG/DL — SIGNIFICANT CHANGE UP (ref 70–99)
GLUCOSE SERPL-MCNC: 80 MG/DL — SIGNIFICANT CHANGE UP (ref 70–99)
GLUCOSE SERPL-MCNC: 96 MG/DL — SIGNIFICANT CHANGE UP (ref 70–99)
GLUCOSE UR QL: NEGATIVE MG/DL — SIGNIFICANT CHANGE UP
HAPTOGLOB SERPL-MCNC: 174 MG/DL — SIGNIFICANT CHANGE UP (ref 34–200)
HAV IGM SER-ACNC: SIGNIFICANT CHANGE UP
HBV CORE IGM SER-ACNC: SIGNIFICANT CHANGE UP
HBV SURFACE AG SER-ACNC: SIGNIFICANT CHANGE UP
HCO3 BLDA-SCNC: 26 MMOL/L — SIGNIFICANT CHANGE UP (ref 21–28)
HCT VFR BLD CALC: 24.9 % — LOW (ref 42–52)
HCT VFR BLD CALC: 28.3 % — LOW (ref 42–52)
HCT VFR BLD CALC: 28.8 % — LOW (ref 42–52)
HCT VFR BLD CALC: 29.2 % — LOW (ref 42–52)
HCT VFR BLD CALC: 29.3 % — LOW (ref 42–52)
HCT VFR BLD CALC: 30.4 % — LOW (ref 42–52)
HCT VFR BLD CALC: 31.9 % — LOW (ref 42–52)
HCT VFR BLD CALC: 32 % — LOW (ref 42–52)
HCV AB S/CO SERPL IA: 0.06 S/CO — SIGNIFICANT CHANGE UP (ref 0–0.99)
HCV AB SERPL-IMP: SIGNIFICANT CHANGE UP
HEPARIN-PF4 AB RESULT: <0.6 U/ML — SIGNIFICANT CHANGE UP (ref 0–0.9)
HGB BLD-MCNC: 10.1 G/DL — LOW (ref 14–18)
HGB BLD-MCNC: 10.3 G/DL — LOW (ref 14–18)
HGB BLD-MCNC: 8 G/DL — LOW (ref 14–18)
HGB BLD-MCNC: 9 G/DL — LOW (ref 14–18)
HGB BLD-MCNC: 9 G/DL — LOW (ref 14–18)
HGB BLD-MCNC: 9.2 G/DL — LOW (ref 14–18)
HGB BLD-MCNC: 9.4 G/DL — LOW (ref 14–18)
HGB BLD-MCNC: 9.7 G/DL — LOW (ref 14–18)
HIV 1+2 AB+HIV1 P24 AG SERPL QL IA: SIGNIFICANT CHANGE UP
HOROWITZ INDEX BLDA+IHG-RTO: 40 — SIGNIFICANT CHANGE UP
IMM GRANULOCYTES NFR BLD AUTO: 0.2 % — SIGNIFICANT CHANGE UP (ref 0.1–0.3)
IMM GRANULOCYTES NFR BLD AUTO: 0.3 % — SIGNIFICANT CHANGE UP (ref 0.1–0.3)
IMM GRANULOCYTES NFR BLD AUTO: 0.3 % — SIGNIFICANT CHANGE UP (ref 0.1–0.3)
IMM GRANULOCYTES NFR BLD AUTO: 0.4 % — HIGH (ref 0.1–0.3)
IMM GRANULOCYTES NFR BLD AUTO: 0.4 % — HIGH (ref 0.1–0.3)
IMM GRANULOCYTES NFR BLD AUTO: 0.6 % — HIGH (ref 0.1–0.3)
INR BLD: 0.89 RATIO — SIGNIFICANT CHANGE UP (ref 0.65–1.3)
INR BLD: 0.93 RATIO — SIGNIFICANT CHANGE UP (ref 0.65–1.3)
INR BLD: 0.94 RATIO — SIGNIFICANT CHANGE UP (ref 0.65–1.3)
IRON SATN MFR SERPL: 137 UG/DL — SIGNIFICANT CHANGE UP (ref 35–150)
IRON SATN MFR SERPL: 56 % — HIGH (ref 15–50)
KETONES UR-MCNC: NEGATIVE MG/DL — SIGNIFICANT CHANGE UP
LACTATE SERPL-SCNC: 0.5 MMOL/L — LOW (ref 0.7–2)
LACTATE SERPL-SCNC: 0.7 MMOL/L — SIGNIFICANT CHANGE UP (ref 0.7–2)
LDH SERPL L TO P-CCNC: 266 U/L — HIGH (ref 50–242)
LEGIONELLA AG UR QL: NEGATIVE — SIGNIFICANT CHANGE UP
LEUKOCYTE ESTERASE UR-ACNC: ABNORMAL
LYMPHOCYTES # BLD AUTO: 0.19 K/UL — LOW (ref 1.2–3.4)
LYMPHOCYTES # BLD AUTO: 0.25 K/UL — LOW (ref 1.2–3.4)
LYMPHOCYTES # BLD AUTO: 0.25 K/UL — LOW (ref 1.2–3.4)
LYMPHOCYTES # BLD AUTO: 0.28 K/UL — LOW (ref 1.2–3.4)
LYMPHOCYTES # BLD AUTO: 0.4 K/UL — LOW (ref 1.2–3.4)
LYMPHOCYTES # BLD AUTO: 0.56 K/UL — LOW (ref 1.2–3.4)
LYMPHOCYTES # BLD AUTO: 0.6 K/UL — LOW (ref 1.2–3.4)
LYMPHOCYTES # BLD AUTO: 1.9 % — LOW (ref 20.5–51.1)
LYMPHOCYTES # BLD AUTO: 17.1 % — LOW (ref 20.5–51.1)
LYMPHOCYTES # BLD AUTO: 2.4 % — LOW (ref 20.5–51.1)
LYMPHOCYTES # BLD AUTO: 3.4 % — LOW (ref 20.5–51.1)
LYMPHOCYTES # BLD AUTO: 4.3 % — LOW (ref 20.5–51.1)
LYMPHOCYTES # BLD AUTO: 6 % — LOW (ref 20.5–51.1)
LYMPHOCYTES # BLD AUTO: 7.4 % — LOW (ref 20.5–51.1)
MAGNESIUM SERPL-MCNC: 1.7 MG/DL — LOW (ref 1.8–2.4)
MAGNESIUM SERPL-MCNC: 1.8 MG/DL — SIGNIFICANT CHANGE UP (ref 1.8–2.4)
MAGNESIUM SERPL-MCNC: 1.9 MG/DL — SIGNIFICANT CHANGE UP (ref 1.8–2.4)
MCHC RBC-ENTMCNC: 30 PG — SIGNIFICANT CHANGE UP (ref 27–31)
MCHC RBC-ENTMCNC: 30.1 PG — SIGNIFICANT CHANGE UP (ref 27–31)
MCHC RBC-ENTMCNC: 30.2 PG — SIGNIFICANT CHANGE UP (ref 27–31)
MCHC RBC-ENTMCNC: 30.3 PG — SIGNIFICANT CHANGE UP (ref 27–31)
MCHC RBC-ENTMCNC: 30.4 PG — SIGNIFICANT CHANGE UP (ref 27–31)
MCHC RBC-ENTMCNC: 30.6 PG — SIGNIFICANT CHANGE UP (ref 27–31)
MCHC RBC-ENTMCNC: 30.8 PG — SIGNIFICANT CHANGE UP (ref 27–31)
MCHC RBC-ENTMCNC: 30.8 PG — SIGNIFICANT CHANGE UP (ref 27–31)
MCHC RBC-ENTMCNC: 31.3 G/DL — LOW (ref 32–37)
MCHC RBC-ENTMCNC: 31.5 G/DL — LOW (ref 32–37)
MCHC RBC-ENTMCNC: 31.6 G/DL — LOW (ref 32–37)
MCHC RBC-ENTMCNC: 31.8 G/DL — LOW (ref 32–37)
MCHC RBC-ENTMCNC: 31.9 G/DL — LOW (ref 32–37)
MCHC RBC-ENTMCNC: 32.1 G/DL — SIGNIFICANT CHANGE UP (ref 32–37)
MCHC RBC-ENTMCNC: 32.1 G/DL — SIGNIFICANT CHANGE UP (ref 32–37)
MCHC RBC-ENTMCNC: 32.3 G/DL — SIGNIFICANT CHANGE UP (ref 32–37)
MCV RBC AUTO: 94 FL — SIGNIFICANT CHANGE UP (ref 80–94)
MCV RBC AUTO: 94.3 FL — HIGH (ref 80–94)
MCV RBC AUTO: 95.4 FL — HIGH (ref 80–94)
MCV RBC AUTO: 95.5 FL — HIGH (ref 80–94)
MCV RBC AUTO: 95.5 FL — HIGH (ref 80–94)
MCV RBC AUTO: 96.1 FL — HIGH (ref 80–94)
MCV RBC AUTO: 96.5 FL — HIGH (ref 80–94)
MCV RBC AUTO: 97.3 FL — HIGH (ref 80–94)
MONOCYTES # BLD AUTO: 0.21 K/UL — SIGNIFICANT CHANGE UP (ref 0.1–0.6)
MONOCYTES # BLD AUTO: 0.35 K/UL — SIGNIFICANT CHANGE UP (ref 0.1–0.6)
MONOCYTES # BLD AUTO: 0.35 K/UL — SIGNIFICANT CHANGE UP (ref 0.1–0.6)
MONOCYTES # BLD AUTO: 0.4 K/UL — SIGNIFICANT CHANGE UP (ref 0.1–0.6)
MONOCYTES # BLD AUTO: 0.48 K/UL — SIGNIFICANT CHANGE UP (ref 0.1–0.6)
MONOCYTES # BLD AUTO: 0.55 K/UL — SIGNIFICANT CHANGE UP (ref 0.1–0.6)
MONOCYTES # BLD AUTO: 0.58 K/UL — SIGNIFICANT CHANGE UP (ref 0.1–0.6)
MONOCYTES NFR BLD AUTO: 4.3 % — SIGNIFICANT CHANGE UP (ref 1.7–9.3)
MONOCYTES NFR BLD AUTO: 4.4 % — SIGNIFICANT CHANGE UP (ref 1.7–9.3)
MONOCYTES NFR BLD AUTO: 4.5 % — SIGNIFICANT CHANGE UP (ref 1.7–9.3)
MONOCYTES NFR BLD AUTO: 6.1 % — SIGNIFICANT CHANGE UP (ref 1.7–9.3)
MONOCYTES NFR BLD AUTO: 6.4 % — SIGNIFICANT CHANGE UP (ref 1.7–9.3)
MONOCYTES NFR BLD AUTO: 7.2 % — SIGNIFICANT CHANGE UP (ref 1.7–9.3)
MONOCYTES NFR BLD AUTO: 7.6 % — SIGNIFICANT CHANGE UP (ref 1.7–9.3)
MRSA PCR RESULT.: NEGATIVE — SIGNIFICANT CHANGE UP
NEUTROPHILS # BLD AUTO: 12.25 K/UL — HIGH (ref 1.4–6.5)
NEUTROPHILS # BLD AUTO: 2.44 K/UL — SIGNIFICANT CHANGE UP (ref 1.4–6.5)
NEUTROPHILS # BLD AUTO: 5.52 K/UL — SIGNIFICANT CHANGE UP (ref 1.4–6.5)
NEUTROPHILS # BLD AUTO: 5.77 K/UL — SIGNIFICANT CHANGE UP (ref 1.4–6.5)
NEUTROPHILS # BLD AUTO: 6.43 K/UL — SIGNIFICANT CHANGE UP (ref 1.4–6.5)
NEUTROPHILS # BLD AUTO: 7.15 K/UL — HIGH (ref 1.4–6.5)
NEUTROPHILS # BLD AUTO: 7.19 K/UL — HIGH (ref 1.4–6.5)
NEUTROPHILS NFR BLD AUTO: 74.4 % — SIGNIFICANT CHANGE UP (ref 42.2–75.2)
NEUTROPHILS NFR BLD AUTO: 80.9 % — HIGH (ref 42.2–75.2)
NEUTROPHILS NFR BLD AUTO: 86.1 % — HIGH (ref 42.2–75.2)
NEUTROPHILS NFR BLD AUTO: 88.1 % — HIGH (ref 42.2–75.2)
NEUTROPHILS NFR BLD AUTO: 88.4 % — HIGH (ref 42.2–75.2)
NEUTROPHILS NFR BLD AUTO: 92.5 % — HIGH (ref 42.2–75.2)
NEUTROPHILS NFR BLD AUTO: 93.1 % — HIGH (ref 42.2–75.2)
NITRITE UR-MCNC: NEGATIVE — SIGNIFICANT CHANGE UP
NRBC # BLD: 0 /100 WBCS — SIGNIFICANT CHANGE UP (ref 0–0)
NRBC # BLD: 1 /100 WBCS — HIGH (ref 0–0)
NRBC BLD-RTO: 0 /100 WBCS — SIGNIFICANT CHANGE UP (ref 0–0)
NRBC BLD-RTO: 1 /100 WBCS — HIGH (ref 0–0)
PCO2 BLDA: 56 MMHG — HIGH (ref 35–48)
PF4 HEPARIN CMPLX AB SER-ACNC: NEGATIVE — SIGNIFICANT CHANGE UP
PH BLDA: 7.27 — LOW (ref 7.35–7.45)
PH UR: 6.5 — SIGNIFICANT CHANGE UP (ref 5–8)
PHOSPHATE SERPL-MCNC: 1 MG/DL — LOW (ref 2.1–4.9)
PHOSPHATE SERPL-MCNC: 1.3 MG/DL — LOW (ref 2.1–4.9)
PLATELET # BLD AUTO: 28 K/UL — LOW (ref 130–400)
PLATELET # BLD AUTO: 46 K/UL — LOW (ref 130–400)
PLATELET # BLD AUTO: 52 K/UL — LOW (ref 130–400)
PLATELET # BLD AUTO: 53 K/UL — LOW (ref 130–400)
PLATELET # BLD AUTO: 66 K/UL — LOW (ref 130–400)
PLATELET # BLD AUTO: 75 K/UL — LOW (ref 130–400)
PLATELET # BLD AUTO: 86 K/UL — LOW (ref 130–400)
PLATELET # BLD AUTO: 95 K/UL — LOW (ref 130–400)
PMV BLD: 10.1 FL — SIGNIFICANT CHANGE UP (ref 7.4–10.4)
PMV BLD: 10.2 FL — SIGNIFICANT CHANGE UP (ref 7.4–10.4)
PMV BLD: 10.4 FL — SIGNIFICANT CHANGE UP (ref 7.4–10.4)
PMV BLD: 10.6 FL — HIGH (ref 7.4–10.4)
PMV BLD: 10.6 FL — HIGH (ref 7.4–10.4)
PMV BLD: 10.7 FL — HIGH (ref 7.4–10.4)
PMV BLD: 10.9 FL — HIGH (ref 7.4–10.4)
PMV BLD: 12.1 FL — HIGH (ref 7.4–10.4)
PO2 BLDA: 94 MMHG — SIGNIFICANT CHANGE UP (ref 83–108)
POTASSIUM SERPL-MCNC: 3.3 MMOL/L — LOW (ref 3.5–5)
POTASSIUM SERPL-MCNC: 3.4 MMOL/L — LOW (ref 3.5–5)
POTASSIUM SERPL-MCNC: 3.8 MMOL/L — SIGNIFICANT CHANGE UP (ref 3.5–5)
POTASSIUM SERPL-MCNC: 3.8 MMOL/L — SIGNIFICANT CHANGE UP (ref 3.5–5)
POTASSIUM SERPL-MCNC: 3.9 MMOL/L — SIGNIFICANT CHANGE UP (ref 3.5–5)
POTASSIUM SERPL-MCNC: 4.2 MMOL/L — SIGNIFICANT CHANGE UP (ref 3.5–5)
POTASSIUM SERPL-MCNC: 4.2 MMOL/L — SIGNIFICANT CHANGE UP (ref 3.5–5)
POTASSIUM SERPL-SCNC: 3.3 MMOL/L — LOW (ref 3.5–5)
POTASSIUM SERPL-SCNC: 3.4 MMOL/L — LOW (ref 3.5–5)
POTASSIUM SERPL-SCNC: 3.8 MMOL/L — SIGNIFICANT CHANGE UP (ref 3.5–5)
POTASSIUM SERPL-SCNC: 3.8 MMOL/L — SIGNIFICANT CHANGE UP (ref 3.5–5)
POTASSIUM SERPL-SCNC: 3.9 MMOL/L — SIGNIFICANT CHANGE UP (ref 3.5–5)
POTASSIUM SERPL-SCNC: 4.2 MMOL/L — SIGNIFICANT CHANGE UP (ref 3.5–5)
POTASSIUM SERPL-SCNC: 4.2 MMOL/L — SIGNIFICANT CHANGE UP (ref 3.5–5)
PROCALCITONIN SERPL-MCNC: 1.34 NG/ML — HIGH (ref 0.02–0.1)
PROT SERPL-MCNC: 4.2 G/DL — LOW (ref 6–8)
PROT SERPL-MCNC: 4.8 G/DL — LOW (ref 6–8)
PROT SERPL-MCNC: 5.3 G/DL — LOW (ref 6–8)
PROT UR-MCNC: SIGNIFICANT CHANGE UP MG/DL
PROTHROM AB SERPL-ACNC: 10.5 SEC — SIGNIFICANT CHANGE UP (ref 9.95–12.87)
PROTHROM AB SERPL-ACNC: 10.9 SEC — SIGNIFICANT CHANGE UP (ref 9.95–12.87)
PROTHROM AB SERPL-ACNC: 11.1 SEC — SIGNIFICANT CHANGE UP (ref 9.95–12.87)
RAPID RVP RESULT: SIGNIFICANT CHANGE UP
RBC # BLD: 2.65 M/UL — LOW (ref 4.7–6.1)
RBC # BLD: 2.96 M/UL — LOW (ref 4.7–6.1)
RBC # BLD: 3 M/UL — LOW (ref 4.7–6.1)
RBC # BLD: 3.04 M/UL — LOW (ref 4.7–6.1)
RBC # BLD: 3.07 M/UL — LOW (ref 4.7–6.1)
RBC # BLD: 3.15 M/UL — LOW (ref 4.7–6.1)
RBC # BLD: 3.15 M/UL — LOW (ref 4.7–6.1)
RBC # BLD: 3.34 M/UL — LOW (ref 4.7–6.1)
RBC # BLD: 3.35 M/UL — LOW (ref 4.7–6.1)
RBC # FLD: 17.7 % — HIGH (ref 11.5–14.5)
RBC # FLD: 18 % — HIGH (ref 11.5–14.5)
RBC # FLD: 18.2 % — HIGH (ref 11.5–14.5)
RBC # FLD: 18.4 % — HIGH (ref 11.5–14.5)
RBC # FLD: 18.6 % — HIGH (ref 11.5–14.5)
RETICS #: 57.6 K/UL — SIGNIFICANT CHANGE UP (ref 25–125)
RETICS/RBC NFR: 1.8 % — HIGH (ref 0.5–1.5)
RHEUMATOID FACT SERPL-ACNC: 21 IU/ML — HIGH (ref 0–13)
RSV RNA NPH QL NAA+NON-PROBE: SIGNIFICANT CHANGE UP
S PNEUM AG UR QL: NEGATIVE — SIGNIFICANT CHANGE UP
SAO2 % BLDA: 98.7 % — HIGH (ref 94–98)
SARS-COV-2 RNA SPEC QL NAA+PROBE: SIGNIFICANT CHANGE UP
SARS-COV-2 RNA SPEC QL NAA+PROBE: SIGNIFICANT CHANGE UP
SODIUM SERPL-SCNC: 143 MMOL/L — SIGNIFICANT CHANGE UP (ref 135–146)
SODIUM SERPL-SCNC: 148 MMOL/L — HIGH (ref 135–146)
SODIUM SERPL-SCNC: 148 MMOL/L — HIGH (ref 135–146)
SODIUM SERPL-SCNC: 149 MMOL/L — HIGH (ref 135–146)
SODIUM SERPL-SCNC: 150 MMOL/L — HIGH (ref 135–146)
SP GR SPEC: 1.02 — SIGNIFICANT CHANGE UP (ref 1–1.03)
SPECIMEN SOURCE: SIGNIFICANT CHANGE UP
SPECIMEN SOURCE: SIGNIFICANT CHANGE UP
T3FREE SERPL-MCNC: 0.94 PG/ML — LOW (ref 2–4.4)
T4 FREE SERPL-MCNC: 1.2 NG/DL — SIGNIFICANT CHANGE UP (ref 0.9–1.8)
TIBC SERPL-MCNC: 243 UG/DL — SIGNIFICANT CHANGE UP (ref 220–430)
TSH SERPL-MCNC: 4.55 UIU/ML — HIGH (ref 0.27–4.2)
UIBC SERPL-MCNC: 106 UG/DL — LOW (ref 110–370)
UROBILINOGEN FLD QL: 1 MG/DL — SIGNIFICANT CHANGE UP (ref 0.2–1)
VIT B12 SERPL-MCNC: 1701 PG/ML — HIGH (ref 232–1245)
WBC # BLD: 13.16 K/UL — HIGH (ref 4.8–10.8)
WBC # BLD: 3.28 K/UL — LOW (ref 4.8–10.8)
WBC # BLD: 3.39 K/UL — LOW (ref 4.8–10.8)
WBC # BLD: 6.54 K/UL — SIGNIFICANT CHANGE UP (ref 4.8–10.8)
WBC # BLD: 6.7 K/UL — SIGNIFICANT CHANGE UP (ref 4.8–10.8)
WBC # BLD: 7.28 K/UL — SIGNIFICANT CHANGE UP (ref 4.8–10.8)
WBC # BLD: 7.78 K/UL — SIGNIFICANT CHANGE UP (ref 4.8–10.8)
WBC # BLD: 8.12 K/UL — SIGNIFICANT CHANGE UP (ref 4.8–10.8)
WBC # FLD AUTO: 13.16 K/UL — HIGH (ref 4.8–10.8)
WBC # FLD AUTO: 3.28 K/UL — LOW (ref 4.8–10.8)
WBC # FLD AUTO: 3.39 K/UL — LOW (ref 4.8–10.8)
WBC # FLD AUTO: 6.54 K/UL — SIGNIFICANT CHANGE UP (ref 4.8–10.8)
WBC # FLD AUTO: 6.7 K/UL — SIGNIFICANT CHANGE UP (ref 4.8–10.8)
WBC # FLD AUTO: 7.28 K/UL — SIGNIFICANT CHANGE UP (ref 4.8–10.8)
WBC # FLD AUTO: 7.78 K/UL — SIGNIFICANT CHANGE UP (ref 4.8–10.8)
WBC # FLD AUTO: 8.12 K/UL — SIGNIFICANT CHANGE UP (ref 4.8–10.8)

## 2024-01-01 PROCEDURE — 74176 CT ABD & PELVIS W/O CONTRAST: CPT | Mod: 26,MC

## 2024-01-01 PROCEDURE — 85025 COMPLETE CBC W/AUTO DIFF WBC: CPT

## 2024-01-01 PROCEDURE — 71045 X-RAY EXAM CHEST 1 VIEW: CPT

## 2024-01-01 PROCEDURE — 93306 TTE W/DOPPLER COMPLETE: CPT | Mod: 26

## 2024-01-01 PROCEDURE — 99291 CRITICAL CARE FIRST HOUR: CPT

## 2024-01-01 PROCEDURE — 99223 1ST HOSP IP/OBS HIGH 75: CPT

## 2024-01-01 PROCEDURE — 83605 ASSAY OF LACTIC ACID: CPT

## 2024-01-01 PROCEDURE — 85014 HEMATOCRIT: CPT

## 2024-01-01 PROCEDURE — 84439 ASSAY OF FREE THYROXINE: CPT

## 2024-01-01 PROCEDURE — 86038 ANTINUCLEAR ANTIBODIES: CPT

## 2024-01-01 PROCEDURE — 86880 COOMBS TEST DIRECT: CPT

## 2024-01-01 PROCEDURE — 71250 CT THORAX DX C-: CPT | Mod: 26,MC

## 2024-01-01 PROCEDURE — 84100 ASSAY OF PHOSPHORUS: CPT

## 2024-01-01 PROCEDURE — 87449 NOS EACH ORGANISM AG IA: CPT

## 2024-01-01 PROCEDURE — 85379 FIBRIN DEGRADATION QUANT: CPT

## 2024-01-01 PROCEDURE — 86431 RHEUMATOID FACTOR QUANT: CPT

## 2024-01-01 PROCEDURE — 99285 EMERGENCY DEPT VISIT HI MDM: CPT | Mod: 25

## 2024-01-01 PROCEDURE — 87899 AGENT NOS ASSAY W/OPTIC: CPT

## 2024-01-01 PROCEDURE — 84449 ASSAY OF TRANSCORTIN: CPT

## 2024-01-01 PROCEDURE — 85385 FIBRINOGEN ANTIGEN: CPT

## 2024-01-01 PROCEDURE — 86022 PLATELET ANTIBODIES: CPT

## 2024-01-01 PROCEDURE — 93306 TTE W/DOPPLER COMPLETE: CPT

## 2024-01-01 PROCEDURE — 87641 MR-STAPH DNA AMP PROBE: CPT

## 2024-01-01 PROCEDURE — 94640 AIRWAY INHALATION TREATMENT: CPT

## 2024-01-01 PROCEDURE — 85730 THROMBOPLASTIN TIME PARTIAL: CPT

## 2024-01-01 PROCEDURE — 83615 LACTATE (LD) (LDH) ENZYME: CPT

## 2024-01-01 PROCEDURE — 92612 ENDOSCOPY SWALLOW (FEES) VID: CPT | Mod: GN

## 2024-01-01 PROCEDURE — 94660 CPAP INITIATION&MGMT: CPT

## 2024-01-01 PROCEDURE — 92526 ORAL FUNCTION THERAPY: CPT | Mod: GN

## 2024-01-01 PROCEDURE — 85384 FIBRINOGEN ACTIVITY: CPT

## 2024-01-01 PROCEDURE — 80053 COMPREHEN METABOLIC PANEL: CPT

## 2024-01-01 PROCEDURE — 85018 HEMOGLOBIN: CPT

## 2024-01-01 PROCEDURE — 83010 ASSAY OF HAPTOGLOBIN QUANT: CPT

## 2024-01-01 PROCEDURE — 92610 EVALUATE SWALLOWING FUNCTION: CPT | Mod: GN

## 2024-01-01 PROCEDURE — 95819 EEG AWAKE AND ASLEEP: CPT

## 2024-01-01 PROCEDURE — 70450 CT HEAD/BRAIN W/O DYE: CPT | Mod: 26,MC

## 2024-01-01 PROCEDURE — 84443 ASSAY THYROID STIM HORMONE: CPT

## 2024-01-01 PROCEDURE — 85027 COMPLETE CBC AUTOMATED: CPT

## 2024-01-01 PROCEDURE — 95816 EEG AWAKE AND DROWSY: CPT | Mod: 26

## 2024-01-01 PROCEDURE — 87389 HIV-1 AG W/HIV-1&-2 AB AG IA: CPT

## 2024-01-01 PROCEDURE — 82607 VITAMIN B-12: CPT

## 2024-01-01 PROCEDURE — 82962 GLUCOSE BLOOD TEST: CPT

## 2024-01-01 PROCEDURE — 99233 SBSQ HOSP IP/OBS HIGH 50: CPT

## 2024-01-01 PROCEDURE — 93970 EXTREMITY STUDY: CPT | Mod: 26

## 2024-01-01 PROCEDURE — 83921 ORGANIC ACID SINGLE QUANT: CPT

## 2024-01-01 PROCEDURE — 0225U NFCT DS DNA&RNA 21 SARSCOV2: CPT

## 2024-01-01 PROCEDURE — 85652 RBC SED RATE AUTOMATED: CPT

## 2024-01-01 PROCEDURE — 80074 ACUTE HEPATITIS PANEL: CPT

## 2024-01-01 PROCEDURE — 85045 AUTOMATED RETICULOCYTE COUNT: CPT

## 2024-01-01 PROCEDURE — 72125 CT NECK SPINE W/O DYE: CPT | Mod: 26,MC

## 2024-01-01 PROCEDURE — 71045 X-RAY EXAM CHEST 1 VIEW: CPT | Mod: 26

## 2024-01-01 PROCEDURE — 83735 ASSAY OF MAGNESIUM: CPT

## 2024-01-01 PROCEDURE — 85362 FIBRIN DEGRADATION PRODUCTS: CPT

## 2024-01-01 PROCEDURE — 82746 ASSAY OF FOLIC ACID SERUM: CPT

## 2024-01-01 PROCEDURE — 84295 ASSAY OF SERUM SODIUM: CPT

## 2024-01-01 PROCEDURE — 99222 1ST HOSP IP/OBS MODERATE 55: CPT

## 2024-01-01 PROCEDURE — 36415 COLL VENOUS BLD VENIPUNCTURE: CPT

## 2024-01-01 PROCEDURE — 83550 IRON BINDING TEST: CPT

## 2024-01-01 PROCEDURE — 82330 ASSAY OF CALCIUM: CPT

## 2024-01-01 PROCEDURE — 86140 C-REACTIVE PROTEIN: CPT

## 2024-01-01 PROCEDURE — 82728 ASSAY OF FERRITIN: CPT

## 2024-01-01 PROCEDURE — 84145 PROCALCITONIN (PCT): CPT

## 2024-01-01 PROCEDURE — 94760 N-INVAS EAR/PLS OXIMETRY 1: CPT

## 2024-01-01 PROCEDURE — 93970 EXTREMITY STUDY: CPT

## 2024-01-01 PROCEDURE — 82803 BLOOD GASES ANY COMBINATION: CPT

## 2024-01-01 PROCEDURE — 82533 TOTAL CORTISOL: CPT

## 2024-01-01 PROCEDURE — 83540 ASSAY OF IRON: CPT

## 2024-01-01 PROCEDURE — 84132 ASSAY OF SERUM POTASSIUM: CPT

## 2024-01-01 PROCEDURE — 87640 STAPH A DNA AMP PROBE: CPT

## 2024-01-01 PROCEDURE — 80048 BASIC METABOLIC PNL TOTAL CA: CPT

## 2024-01-01 PROCEDURE — 85610 PROTHROMBIN TIME: CPT

## 2024-01-01 PROCEDURE — 84481 FREE ASSAY (FT-3): CPT

## 2024-01-01 PROCEDURE — 36556 INSERT NON-TUNNEL CV CATH: CPT

## 2024-01-01 RX ORDER — CEFEPIME 2 G/20ML
2000 INJECTION, POWDER, FOR SOLUTION INTRAVENOUS ONCE
Refills: 0 | Status: COMPLETED | OUTPATIENT
Start: 2024-01-01 | End: 2024-01-01

## 2024-01-01 RX ORDER — NOREPINEPHRINE BITARTRATE 8 MG
0.05 SOLUTION INTRAVENOUS
Qty: 8 | Refills: 0 | Status: DISCONTINUED | OUTPATIENT
Start: 2024-01-01 | End: 2024-01-01

## 2024-01-01 RX ORDER — CEFEPIME 2 G/20ML
INJECTION, POWDER, FOR SOLUTION INTRAVENOUS
Refills: 0 | Status: DISCONTINUED | OUTPATIENT
Start: 2024-01-01 | End: 2024-01-01

## 2024-01-01 RX ORDER — PIPERACILLIN-TAZO-DEXTROSE,ISO 3.375G/5
3.38 IV SOLUTION, PIGGYBACK PREMIX FROZEN(ML) INTRAVENOUS ONCE
Refills: 0 | Status: COMPLETED | OUTPATIENT
Start: 2024-01-01 | End: 2024-01-01

## 2024-01-01 RX ORDER — HYDROCORTISONE 20 MG
50 TABLET ORAL EVERY 6 HOURS
Refills: 0 | Status: DISCONTINUED | OUTPATIENT
Start: 2024-01-01 | End: 2024-01-01

## 2024-01-01 RX ORDER — SODIUM CHLORIDE 9 G/1000ML
500 INJECTION, SOLUTION INTRAVENOUS ONCE
Refills: 0 | Status: COMPLETED | OUTPATIENT
Start: 2024-01-01 | End: 2024-01-01

## 2024-01-01 RX ORDER — CEFTRIAXONE 500 MG/1
1000 INJECTION, POWDER, FOR SOLUTION INTRAMUSCULAR; INTRAVENOUS EVERY 24 HOURS
Refills: 0 | Status: DISCONTINUED | OUTPATIENT
Start: 2024-01-01 | End: 2024-01-01

## 2024-01-01 RX ORDER — SODIUM CHLORIDE 9 G/1000ML
1000 INJECTION, SOLUTION INTRAVENOUS
Refills: 0 | Status: DISCONTINUED | OUTPATIENT
Start: 2024-01-01 | End: 2024-01-01

## 2024-01-01 RX ORDER — HYDROMORPHONE/SOD CHLOR,ISO/PF 2 MG/10 ML
1 SYRINGE (ML) INJECTION
Refills: 0 | Status: DISCONTINUED | OUTPATIENT
Start: 2024-01-01 | End: 2024-01-01

## 2024-01-01 RX ORDER — VANCOMYCIN HCL IN 5 % DEXTROSE 1.5G/250ML
1250 PLASTIC BAG, INJECTION (ML) INTRAVENOUS EVERY 12 HOURS
Refills: 0 | Status: DISCONTINUED | OUTPATIENT
Start: 2024-01-01 | End: 2024-01-01

## 2024-01-01 RX ORDER — VASOPRESSIN 20 [USP'U]/ML
0.04 INJECTION INTRAVENOUS
Qty: 40 | Refills: 0 | Status: DISCONTINUED | OUTPATIENT
Start: 2024-01-01 | End: 2024-01-01

## 2024-01-01 RX ORDER — MAGNESIUM SULFATE 500 MG/ML
2 SYRINGE (ML) INJECTION ONCE
Refills: 0 | Status: COMPLETED | OUTPATIENT
Start: 2024-01-01 | End: 2024-01-01

## 2024-01-01 RX ORDER — SODIUM PHOSPHATE,DIBASIC DIHYD
30 POWDER (GRAM) MISCELLANEOUS ONCE
Refills: 0 | Status: COMPLETED | OUTPATIENT
Start: 2024-01-01 | End: 2024-01-01

## 2024-01-01 RX ORDER — FONDAPARINUX SODIUM 5 MG/.4ML
5 INJECTION SUBCUTANEOUS DAILY
Refills: 0 | Status: DISCONTINUED | OUTPATIENT
Start: 2024-01-01 | End: 2024-01-01

## 2024-01-01 RX ORDER — ALBUTEROL SULFATE 2.5 MG/3ML
2 VIAL, NEBULIZER (ML) INHALATION
Refills: 0 | DISCHARGE

## 2024-01-01 RX ORDER — VANCOMYCIN HCL IN 5 % DEXTROSE 1.5G/250ML
1000 PLASTIC BAG, INJECTION (ML) INTRAVENOUS ONCE
Refills: 0 | Status: COMPLETED | OUTPATIENT
Start: 2024-01-01 | End: 2024-01-01

## 2024-01-01 RX ORDER — GLYCOPYRROLATE 0.2 MG/ML
0.2 INJECTION INTRAMUSCULAR; INTRAVENOUS EVERY 6 HOURS
Refills: 0 | Status: DISCONTINUED | OUTPATIENT
Start: 2024-01-01 | End: 2024-01-01

## 2024-01-01 RX ORDER — ENOXAPARIN SODIUM 100 MG/ML
40 INJECTION SUBCUTANEOUS EVERY 24 HOURS
Refills: 0 | Status: DISCONTINUED | OUTPATIENT
Start: 2024-01-01 | End: 2024-01-01

## 2024-01-01 RX ORDER — VANCOMYCIN HCL IN 5 % DEXTROSE 1.5G/250ML
1150 PLASTIC BAG, INJECTION (ML) INTRAVENOUS EVERY 12 HOURS
Refills: 0 | Status: DISCONTINUED | OUTPATIENT
Start: 2024-01-01 | End: 2024-01-01

## 2024-01-01 RX ORDER — NOREPINEPHRINE BITARTRATE 8 MG
0.05 SOLUTION INTRAVENOUS
Qty: 16 | Refills: 0 | Status: DISCONTINUED | OUTPATIENT
Start: 2024-01-01 | End: 2024-01-01

## 2024-01-01 RX ORDER — HEPARIN SODIUM 1000 [USP'U]/ML
5000 INJECTION INTRAVENOUS; SUBCUTANEOUS EVERY 12 HOURS
Refills: 0 | Status: DISCONTINUED | OUTPATIENT
Start: 2024-01-01 | End: 2024-01-01

## 2024-01-01 RX ORDER — ALBUTEROL SULFATE 2.5 MG/3ML
2.5 VIAL, NEBULIZER (ML) INHALATION EVERY 6 HOURS
Refills: 0 | Status: DISCONTINUED | OUTPATIENT
Start: 2024-01-01 | End: 2024-01-01

## 2024-01-01 RX ORDER — AZITHROMYCIN 250 MG
500 CAPSULE ORAL ONCE
Refills: 0 | Status: COMPLETED | OUTPATIENT
Start: 2024-01-01 | End: 2024-01-01

## 2024-01-01 RX ORDER — POLYETHYLENE GLYCOL 3350 17 G/17G
17 POWDER, FOR SOLUTION ORAL DAILY
Refills: 0 | Status: DISCONTINUED | OUTPATIENT
Start: 2024-01-01 | End: 2024-01-01

## 2024-01-01 RX ORDER — CEFEPIME 2 G/20ML
2000 INJECTION, POWDER, FOR SOLUTION INTRAVENOUS EVERY 8 HOURS
Refills: 0 | Status: DISCONTINUED | OUTPATIENT
Start: 2024-01-01 | End: 2024-01-01

## 2024-01-01 RX ORDER — PIPERACILLIN-TAZO-DEXTROSE,ISO 3.375G/5
3.38 IV SOLUTION, PIGGYBACK PREMIX FROZEN(ML) INTRAVENOUS EVERY 8 HOURS
Refills: 0 | Status: DISCONTINUED | OUTPATIENT
Start: 2024-01-01 | End: 2024-01-01

## 2024-01-01 RX ORDER — SENNA 187 MG
2 TABLET ORAL AT BEDTIME
Refills: 0 | Status: DISCONTINUED | OUTPATIENT
Start: 2024-01-01 | End: 2024-01-01

## 2024-01-01 RX ORDER — SODIUM CHLORIDE 9 G/1000ML
1000 INJECTION, SOLUTION INTRAVENOUS ONCE
Refills: 0 | Status: COMPLETED | OUTPATIENT
Start: 2024-01-01 | End: 2024-01-01

## 2024-01-01 RX ORDER — SODIUM CHLORIDE 9 G/1000ML
1000 INJECTION, SOLUTION INTRAVENOUS
Refills: 0 | Status: COMPLETED | OUTPATIENT
Start: 2024-01-01 | End: 2024-01-01

## 2024-01-01 RX ORDER — AZITHROMYCIN 250 MG
CAPSULE ORAL
Refills: 0 | Status: DISCONTINUED | OUTPATIENT
Start: 2024-01-01 | End: 2024-01-01

## 2024-01-01 RX ORDER — FOLIC ACID 1 MG/1
1 TABLET ORAL DAILY
Refills: 0 | Status: DISCONTINUED | OUTPATIENT
Start: 2024-01-01 | End: 2024-01-01

## 2024-01-01 RX ORDER — LORAZEPAM 4 MG/ML
1 VIAL (ML) INJECTION
Refills: 0 | Status: DISCONTINUED | OUTPATIENT
Start: 2024-01-01 | End: 2024-01-01

## 2024-01-01 RX ORDER — CEFEPIME 2 G/20ML
1000 INJECTION, POWDER, FOR SOLUTION INTRAVENOUS ONCE
Refills: 0 | Status: COMPLETED | OUTPATIENT
Start: 2024-01-01 | End: 2024-01-01

## 2024-01-01 RX ORDER — AZITHROMYCIN 250 MG
500 CAPSULE ORAL EVERY 24 HOURS
Refills: 0 | Status: DISCONTINUED | OUTPATIENT
Start: 2024-01-01 | End: 2024-01-01

## 2024-01-01 RX ADMIN — HEPARIN SODIUM 5000 UNIT(S): 1000 INJECTION INTRAVENOUS; SUBCUTANEOUS at 17:48

## 2024-01-01 RX ADMIN — Medication 105 MILLIGRAM(S): at 21:28

## 2024-01-01 RX ADMIN — ENOXAPARIN SODIUM 40 MILLIGRAM(S): 100 INJECTION SUBCUTANEOUS at 08:10

## 2024-01-01 RX ADMIN — Medication 50 MILLIGRAM(S): at 23:21

## 2024-01-01 RX ADMIN — Medication 1 APPLICATION(S): at 05:33

## 2024-01-01 RX ADMIN — Medication 166.67 MILLIGRAM(S): at 04:07

## 2024-01-01 RX ADMIN — ENOXAPARIN SODIUM 40 MILLIGRAM(S): 100 INJECTION SUBCUTANEOUS at 08:15

## 2024-01-01 RX ADMIN — Medication 255 MILLIGRAM(S): at 20:31

## 2024-01-01 RX ADMIN — Medication 50 MILLIGRAM(S): at 18:58

## 2024-01-01 RX ADMIN — CEFEPIME 100 MILLIGRAM(S): 2 INJECTION, POWDER, FOR SOLUTION INTRAVENOUS at 21:31

## 2024-01-01 RX ADMIN — Medication 1 MILLIGRAM(S): at 00:09

## 2024-01-01 RX ADMIN — SODIUM CHLORIDE 1000 MILLILITER(S): 9 INJECTION, SOLUTION INTRAVENOUS at 14:05

## 2024-01-01 RX ADMIN — Medication 50 MILLIGRAM(S): at 08:23

## 2024-01-01 RX ADMIN — Medication 0.5 MILLIGRAM(S): at 21:09

## 2024-01-01 RX ADMIN — Medication 25 GRAM(S): at 20:04

## 2024-01-01 RX ADMIN — NOREPINEPHRINE BITARTRATE 8.15 MICROGRAM(S)/KG/MIN: 8 SOLUTION at 12:24

## 2024-01-01 RX ADMIN — Medication 50 MILLIGRAM(S): at 11:29

## 2024-01-01 RX ADMIN — Medication 2.5 MILLIGRAM(S): at 10:03

## 2024-01-01 RX ADMIN — Medication 85 MILLIMOLE(S): at 10:29

## 2024-01-01 RX ADMIN — CEFEPIME 100 MILLIGRAM(S): 2 INJECTION, POWDER, FOR SOLUTION INTRAVENOUS at 04:07

## 2024-01-01 RX ADMIN — Medication 255 MILLIGRAM(S): at 20:51

## 2024-01-01 RX ADMIN — SODIUM CHLORIDE 500 MILLILITER(S): 9 INJECTION, SOLUTION INTRAVENOUS at 12:23

## 2024-01-01 RX ADMIN — Medication 25 GRAM(S): at 04:30

## 2024-01-01 RX ADMIN — Medication 50 MILLIGRAM(S): at 18:10

## 2024-01-01 RX ADMIN — Medication 1 APPLICATION(S): at 05:42

## 2024-01-01 RX ADMIN — VASOPRESSIN 6 UNIT(S)/MIN: 20 INJECTION INTRAVENOUS at 05:45

## 2024-01-01 RX ADMIN — FOLIC ACID 1 MILLIGRAM(S): 1 TABLET ORAL at 11:38

## 2024-01-01 RX ADMIN — CEFTRIAXONE 100 MILLIGRAM(S): 500 INJECTION, POWDER, FOR SOLUTION INTRAMUSCULAR; INTRAVENOUS at 14:44

## 2024-01-01 RX ADMIN — Medication 25 GRAM(S): at 08:15

## 2024-01-01 RX ADMIN — SODIUM CHLORIDE 75 MILLILITER(S): 9 INJECTION, SOLUTION INTRAVENOUS at 12:23

## 2024-01-01 RX ADMIN — Medication 50 MILLIGRAM(S): at 23:53

## 2024-01-01 RX ADMIN — Medication 0.5 MILLIGRAM(S): at 13:29

## 2024-01-01 RX ADMIN — SODIUM CHLORIDE 75 MILLILITER(S): 9 INJECTION, SOLUTION INTRAVENOUS at 00:45

## 2024-01-01 RX ADMIN — Medication 105 MILLIGRAM(S): at 05:11

## 2024-01-01 RX ADMIN — CEFEPIME 1000 MILLIGRAM(S): 2 INJECTION, POWDER, FOR SOLUTION INTRAVENOUS at 12:23

## 2024-01-01 RX ADMIN — Medication 2.5 MILLIGRAM(S): at 14:33

## 2024-01-01 RX ADMIN — Medication 50 MILLIGRAM(S): at 17:36

## 2024-01-01 RX ADMIN — Medication 1 MILLIGRAM(S): at 00:39

## 2024-01-01 RX ADMIN — Medication 1 APPLICATION(S): at 05:14

## 2024-01-01 RX ADMIN — SODIUM CHLORIDE 500 MILLILITER(S): 9 INJECTION, SOLUTION INTRAVENOUS at 11:59

## 2024-01-01 RX ADMIN — Medication 105 MILLIGRAM(S): at 14:19

## 2024-01-01 RX ADMIN — SODIUM CHLORIDE 75 MILLILITER(S): 9 INJECTION, SOLUTION INTRAVENOUS at 19:08

## 2024-01-01 RX ADMIN — Medication 50 MILLIGRAM(S): at 12:50

## 2024-01-01 RX ADMIN — Medication 105 MILLIGRAM(S): at 13:19

## 2024-01-01 RX ADMIN — CEFEPIME 100 MILLIGRAM(S): 2 INJECTION, POWDER, FOR SOLUTION INTRAVENOUS at 13:43

## 2024-01-01 RX ADMIN — FOLIC ACID 1 MILLIGRAM(S): 1 TABLET ORAL at 15:20

## 2024-01-01 RX ADMIN — SODIUM CHLORIDE 1000 MILLILITER(S): 9 INJECTION, SOLUTION INTRAVENOUS at 13:49

## 2024-01-01 RX ADMIN — Medication 25 GRAM(S): at 12:50

## 2024-01-01 RX ADMIN — Medication 250 MILLIGRAM(S): at 11:59

## 2024-01-01 RX ADMIN — Medication 25 GRAM(S): at 21:00

## 2024-01-01 RX ADMIN — Medication 2.5 MILLIGRAM(S): at 20:54

## 2024-01-01 RX ADMIN — CEFEPIME 100 MILLIGRAM(S): 2 INJECTION, POWDER, FOR SOLUTION INTRAVENOUS at 05:29

## 2024-01-01 RX ADMIN — VASOPRESSIN 6 UNIT(S)/MIN: 20 INJECTION INTRAVENOUS at 22:48

## 2024-01-01 RX ADMIN — Medication 3.38 GRAM(S): at 11:34

## 2024-01-01 RX ADMIN — Medication 2.5 MILLIGRAM(S): at 19:43

## 2024-01-01 RX ADMIN — NOREPINEPHRINE BITARTRATE 7.23 MICROGRAM(S)/KG/MIN: 8 SOLUTION at 22:47

## 2024-01-01 RX ADMIN — Medication 50 MILLIEQUIVALENT(S): at 20:52

## 2024-01-01 RX ADMIN — SODIUM CHLORIDE 75 MILLILITER(S): 9 INJECTION, SOLUTION INTRAVENOUS at 11:09

## 2024-01-01 RX ADMIN — SODIUM CHLORIDE 1000 MILLILITER(S): 9 INJECTION, SOLUTION INTRAVENOUS at 03:47

## 2024-01-01 RX ADMIN — Medication 105 MILLIGRAM(S): at 22:01

## 2024-01-01 RX ADMIN — Medication 25 GRAM(S): at 11:29

## 2024-01-01 RX ADMIN — Medication 50 MILLIGRAM(S): at 05:11

## 2024-01-01 RX ADMIN — Medication 200 GRAM(S): at 08:15

## 2024-01-01 RX ADMIN — Medication 50 MILLIGRAM(S): at 05:29

## 2024-01-01 RX ADMIN — Medication 50 MILLIEQUIVALENT(S): at 18:01

## 2024-01-01 RX ADMIN — HEPARIN SODIUM 5000 UNIT(S): 1000 INJECTION INTRAVENOUS; SUBCUTANEOUS at 05:32

## 2024-01-01 RX ADMIN — Medication 2.5 MILLIGRAM(S): at 01:06

## 2024-01-01 RX ADMIN — CEFEPIME 100 MILLIGRAM(S): 2 INJECTION, POWDER, FOR SOLUTION INTRAVENOUS at 22:48

## 2024-01-01 RX ADMIN — CEFEPIME 100 MILLIGRAM(S): 2 INJECTION, POWDER, FOR SOLUTION INTRAVENOUS at 12:00

## 2024-01-01 RX ADMIN — Medication 255 MILLIGRAM(S): at 18:11

## 2024-01-01 RX ADMIN — Medication 2.5 MILLIGRAM(S): at 19:54

## 2024-01-01 RX ADMIN — Medication 166.67 MILLIGRAM(S): at 05:41

## 2024-01-01 RX ADMIN — Medication 1 APPLICATION(S): at 06:58

## 2024-01-01 RX ADMIN — NOREPINEPHRINE BITARTRATE 8.15 MICROGRAM(S)/KG/MIN: 8 SOLUTION at 18:11

## 2024-01-01 RX ADMIN — CEFTRIAXONE 100 MILLIGRAM(S): 500 INJECTION, POWDER, FOR SOLUTION INTRAMUSCULAR; INTRAVENOUS at 14:09

## 2024-01-01 RX ADMIN — Medication 50 MILLIGRAM(S): at 12:23

## 2024-01-01 RX ADMIN — Medication 1000 MILLIGRAM(S): at 13:00

## 2024-01-01 RX ADMIN — SODIUM CHLORIDE 500 MILLILITER(S): 9 INJECTION, SOLUTION INTRAVENOUS at 23:30

## 2024-01-01 RX ADMIN — CEFEPIME 100 MILLIGRAM(S): 2 INJECTION, POWDER, FOR SOLUTION INTRAVENOUS at 05:42

## 2024-01-01 RX ADMIN — NOREPINEPHRINE BITARTRATE 7.23 MICROGRAM(S)/KG/MIN: 8 SOLUTION at 19:18

## 2024-01-01 RX ADMIN — Medication 1 APPLICATION(S): at 05:34

## 2024-09-16 PROBLEM — G71.00 MUSCULAR DYSTROPHY, UNSPECIFIED: Chronic | Status: ACTIVE | Noted: 2018-12-24

## 2024-10-28 PROBLEM — Z00.00 ENCOUNTER FOR PREVENTIVE HEALTH EXAMINATION: Status: ACTIVE | Noted: 2024-10-28

## 2024-10-31 ENCOUNTER — APPOINTMENT (OUTPATIENT)
Dept: PULMONOLOGY | Facility: CLINIC | Age: 64
End: 2024-10-31
Payer: MEDICARE

## 2024-10-31 VITALS
SYSTOLIC BLOOD PRESSURE: 108 MMHG | OXYGEN SATURATION: 98 % | WEIGHT: 165 LBS | BODY MASS INDEX: 24.44 KG/M2 | RESPIRATION RATE: 16 BRPM | DIASTOLIC BLOOD PRESSURE: 56 MMHG | HEART RATE: 55 BPM | HEIGHT: 69 IN

## 2024-10-31 DIAGNOSIS — J96.12 CHRONIC RESPIRATORY FAILURE WITH HYPERCAPNIA: ICD-10-CM

## 2024-10-31 DIAGNOSIS — R06.02 SHORTNESS OF BREATH: ICD-10-CM

## 2024-10-31 PROCEDURE — 99203 OFFICE O/P NEW LOW 30 MIN: CPT

## 2024-12-02 LAB
ANA TITR SER: NEGATIVE — SIGNIFICANT CHANGE UP
FSP PPP-MCNC: < 5 UG/ML — SIGNIFICANT CHANGE UP
METHYLMALONATE SERPL-SCNC: 77 NMOL/L — SIGNIFICANT CHANGE UP (ref 0–378)

## 2024-12-03 DIAGNOSIS — G82.50 QUADRIPLEGIA, UNSPECIFIED: ICD-10-CM

## 2024-12-03 DIAGNOSIS — Z51.5 ENCOUNTER FOR PALLIATIVE CARE: ICD-10-CM

## 2024-12-03 DIAGNOSIS — G92.8 OTHER TOXIC ENCEPHALOPATHY: ICD-10-CM

## 2024-12-03 DIAGNOSIS — E86.0 DEHYDRATION: ICD-10-CM

## 2024-12-03 DIAGNOSIS — E87.0 HYPEROSMOLALITY AND HYPERNATREMIA: ICD-10-CM

## 2024-12-03 DIAGNOSIS — G71.039: ICD-10-CM

## 2024-12-03 DIAGNOSIS — A41.9 SEPSIS, UNSPECIFIED ORGANISM: ICD-10-CM

## 2024-12-03 DIAGNOSIS — R65.21 SEVERE SEPSIS WITH SEPTIC SHOCK: ICD-10-CM

## 2024-12-03 DIAGNOSIS — D61.818 OTHER PANCYTOPENIA: ICD-10-CM

## 2024-12-03 LAB — FIBRINOGEN AG PPP IA-MCNC: 640 MG/DL — HIGH (ref 233–496)

## 2024-12-10 LAB
CORTICOSTEROID BINDING GLOBULIN RESULT: 1.7 MG/DL — SIGNIFICANT CHANGE UP
CORTIS F/TOTAL MFR SERPL: 49 % — SIGNIFICANT CHANGE UP
CORTIS SERPL-MCNC: 22 UG/DL — HIGH
CORTISOL, FREE RESULT: 11 UG/DL — HIGH